# Patient Record
Sex: FEMALE | Race: WHITE | HISPANIC OR LATINO | Employment: PART TIME | URBAN - METROPOLITAN AREA
[De-identification: names, ages, dates, MRNs, and addresses within clinical notes are randomized per-mention and may not be internally consistent; named-entity substitution may affect disease eponyms.]

---

## 2017-02-21 ENCOUNTER — ALLSCRIPTS OFFICE VISIT (OUTPATIENT)
Dept: OTHER | Facility: OTHER | Age: 32
End: 2017-02-21

## 2017-02-27 ENCOUNTER — ALLSCRIPTS OFFICE VISIT (OUTPATIENT)
Dept: OTHER | Facility: OTHER | Age: 32
End: 2017-02-27

## 2017-02-28 ENCOUNTER — LAB REQUISITION (OUTPATIENT)
Dept: LAB | Facility: HOSPITAL | Age: 32
End: 2017-02-28
Payer: COMMERCIAL

## 2017-02-28 DIAGNOSIS — Z11.3 ENCOUNTER FOR SCREENING FOR INFECTIONS WITH PREDOMINANTLY SEXUAL MODE OF TRANSMISSION: ICD-10-CM

## 2017-02-28 PROCEDURE — 87591 N.GONORRHOEAE DNA AMP PROB: CPT | Performed by: OBSTETRICS & GYNECOLOGY

## 2017-02-28 PROCEDURE — 87491 CHLMYD TRACH DNA AMP PROBE: CPT | Performed by: OBSTETRICS & GYNECOLOGY

## 2017-03-01 LAB
CHLAMYDIA DNA CVX QL NAA+PROBE: NORMAL
N GONORRHOEA DNA GENITAL QL NAA+PROBE: NORMAL

## 2017-04-03 ENCOUNTER — HOSPITAL ENCOUNTER (EMERGENCY)
Facility: HOSPITAL | Age: 32
Discharge: HOME | End: 2017-04-03
Admitting: EMERGENCY MEDICINE
Payer: COMMERCIAL

## 2017-04-03 VITALS
BODY MASS INDEX: 32.31 KG/M2 | HEART RATE: 103 BPM | SYSTOLIC BLOOD PRESSURE: 132 MMHG | WEIGHT: 171 LBS | TEMPERATURE: 98.2 F | RESPIRATION RATE: 18 BRPM | DIASTOLIC BLOOD PRESSURE: 63 MMHG | OXYGEN SATURATION: 100 %

## 2017-04-03 DIAGNOSIS — J06.9 URI (UPPER RESPIRATORY INFECTION): Primary | ICD-10-CM

## 2017-04-03 PROCEDURE — 99282 EMERGENCY DEPT VISIT SF MDM: CPT

## 2017-04-03 RX ORDER — LEVOTHYROXINE SODIUM 0.05 MG/1
50 TABLET ORAL DAILY
COMMUNITY
Start: 2016-05-10 | End: 2018-07-16

## 2017-04-03 RX ORDER — AZITHROMYCIN 250 MG/1
TABLET, FILM COATED ORAL
Qty: 6 TABLET | Refills: 0 | Status: SHIPPED | OUTPATIENT
Start: 2017-04-03 | End: 2018-07-16

## 2017-04-03 RX ORDER — BENZONATATE 100 MG/1
100 CAPSULE ORAL 3 TIMES DAILY PRN
Qty: 30 CAPSULE | Refills: 0 | Status: SHIPPED | OUTPATIENT
Start: 2017-04-03 | End: 2018-07-16

## 2017-04-04 ENCOUNTER — ALLSCRIPTS OFFICE VISIT (OUTPATIENT)
Dept: OTHER | Facility: OTHER | Age: 32
End: 2017-04-04

## 2017-09-27 ENCOUNTER — ALLSCRIPTS OFFICE VISIT (OUTPATIENT)
Dept: OTHER | Facility: OTHER | Age: 32
End: 2017-09-27

## 2017-09-29 ENCOUNTER — ALLSCRIPTS OFFICE VISIT (OUTPATIENT)
Dept: OTHER | Facility: OTHER | Age: 32
End: 2017-09-29

## 2017-11-25 ENCOUNTER — HOSPITAL ENCOUNTER (EMERGENCY)
Facility: HOSPITAL | Age: 32
Discharge: HOME | End: 2017-11-26
Admitting: EMERGENCY MEDICINE
Payer: COMMERCIAL

## 2017-11-25 VITALS
SYSTOLIC BLOOD PRESSURE: 122 MMHG | OXYGEN SATURATION: 99 % | HEART RATE: 87 BPM | RESPIRATION RATE: 16 BRPM | BODY MASS INDEX: 30.23 KG/M2 | DIASTOLIC BLOOD PRESSURE: 81 MMHG | TEMPERATURE: 98.6 F | WEIGHT: 160 LBS

## 2017-11-25 DIAGNOSIS — R51.9 HEADACHE: ICD-10-CM

## 2017-11-25 DIAGNOSIS — M54.6 ACUTE RIGHT-SIDED THORACIC BACK PAIN: ICD-10-CM
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
## 2017-11-25 RX ORDER — IBUPROFEN 600 MG/1
600 TABLET ORAL ONCE
Status: COMPLETED | OUTPATIENT
Start: 2017-11-25 | End: 2017-11-26

## 2017-11-26 ENCOUNTER — APPOINTMENT (EMERGENCY)
Dept: RADIOLOGY | Facility: HOSPITAL | Age: 32
End: 2017-11-26
Payer: COMMERCIAL

## 2017-11-26 PROCEDURE — 99284 EMERGENCY DEPT VISIT MOD MDM: CPT

## 2017-11-26 PROCEDURE — 72070 X-RAY EXAM THORAC SPINE 2VWS: CPT

## 2017-11-26 RX ORDER — CYCLOBENZAPRINE HCL 10 MG
10 TABLET ORAL 3 TIMES DAILY PRN
Qty: 15 TABLET | Refills: 0 | Status: SHIPPED | OUTPATIENT
Start: 2017-11-26 | End: 2018-07-16

## 2017-11-26 RX ORDER — NAPROXEN 500 MG/1
500 TABLET ORAL 2 TIMES DAILY WITH MEALS
Qty: 14 TABLET | Refills: 0 | Status: SHIPPED | OUTPATIENT
Start: 2017-11-26 | End: 2018-07-16

## 2017-11-26 RX ADMIN — IBUPROFEN 600 MG: 600 TABLET, FILM COATED ORAL at 00:07

## 2017-11-26 NOTE — DISCHARGE INSTRUCTIONS
Back Pain   WHAT YOU NEED TO KNOW:   Back pain is common  It can be caused by many conditions, such as arthritis or the breakdown of spinal discs  Your risk for back pain is increased by injuries, lack of activity, or repeated bending and twisting  You may feel sore or stiff on one or both sides of your back  The pain may spread to your buttocks or thighs  DISCHARGE INSTRUCTIONS:   Medicines:   · NSAIDs  help decrease swelling and pain  This medicine is available with or without a doctor's order  NSAIDs can cause stomach bleeding or kidney problems in certain people  If you take blood thinner medicine, always ask your healthcare provider if NSAIDs are safe for you  Always read the medicine label and follow directions  · Acetaminophen  decreases pain  It is available without a doctor's order  Ask how much to take and how often to take it  Follow directions  Acetaminophen can cause liver damage if not taken correctly  · Prescription pain medicine  may be given  Ask your healthcare provider how to take this medicine safely  · Take your medicine as directed  Contact your healthcare provider if you think your medicine is not helping or if you have side effects  Tell him or her if you are allergic to any medicine  Keep a list of the medicines, vitamins, and herbs you take  Include the amounts, and when and why you take them  Bring the list or the pill bottles to follow-up visits  Carry your medicine list with you in case of an emergency  Follow up with your healthcare provider in 2 weeks, or as directed:  Write down your questions so you remember to ask them during your visits  How to manage your back pain:   · Apply ice  on your back or affected area for 15 to 20 minutes every hour or as directed  Use an ice pack, or put crushed ice in a plastic bag  Cover it with a towel  Ice helps prevent tissue damage and decreases pain      · Apply heat  on your back or affected area for 20 to 30 minutes every 2 hours for as many days as directed  Heat helps decrease pain and muscle spasms  · Stay active  as much as you can without causing more pain  Bed rest could make your back pain worse  Avoid heavy lifting until your pain is gone  Return to the emergency department if:   · You have pain, numbness, or weakness in one or both legs  · Your pain becomes so severe that you cannot walk  · You cannot control your urine or bowel movements  · You have severe back pain with chest pain  · You have severe back pain, nausea, and vomiting  · You have severe back pain that spreads to your side or genital area  Contact your healthcare provider if:   · You have back pain that does not get better with rest and pain medicine  · You have a fever  · You have pain that worsens when you are on your back or when you rest     · You have pain that worsens when you cough or sneeze  · You lose weight without trying  · You have questions or concerns about your condition or care  © 2017 [facility] Information is for End User's use only and may not be sold, redistributed or otherwise used for commercial purposes  All illustrations and images included in CareNotes® are the copyrighted property of [employer] , [email or web address]  or [employer] Nash  The above information is an  only  It is not intended as medical advice for individual conditions or treatments  Talk to your doctor, nurse or pharmacist before following any medical regimen to see if it is safe and effective for you
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
## 2017-12-12 ENCOUNTER — GENERIC CONVERSION - ENCOUNTER (OUTPATIENT)
Dept: FAMILY MEDICINE CLINIC | Facility: CLINIC | Age: 32
End: 2017-12-12

## 2017-12-12 ENCOUNTER — LAB CONVERSION - ENCOUNTER (OUTPATIENT)
Dept: FAMILY MEDICINE CLINIC | Facility: CLINIC | Age: 32
End: 2017-12-12

## 2018-01-09 NOTE — RESULT NOTES
Verified Results  (1) TSH [id number] 08:06AM Amina Carias   TW Order Number: [id number]  TW Order Number: [id number] Order Number: [id number] Order Number: [id number]     Test Name Result Flag Reference   TSH 4 090 uIU/mL H 0 358-3 740   The recommended reference ranges for TSH during pregnancy are as follows:  First trimester 0 1 to 2 5 uIU/mL  Second trimester  0 2 to 3 0 uIU/mL  Third trimester 0 3 to 3 0 uIU/m       Plan  Hypothyroidism    · Levothyroxine Sodium 50 MCG Oral Tablet; TAKE 1 TABLET DAILY on an empty  stomach at least 30 minutes prior to first meal of the day    Discussion/Summary      Thyroid stimulating hormone has increased and is now out of normal range  Due to her fatigue, weight gain and depression symptoms, we will start her on a low dose of thyroid medication that she needs to take every day on an empty stomach and not eat for 30 minutes  She should follow up in 2 months  She should have her ultrasound of her thyroid done before then       Signatures   Electronically signed by : Rashida De La Torre, [facility], [place]; May 10 2016  6:08PM EST                       (Author)

## 2018-01-12 VITALS
SYSTOLIC BLOOD PRESSURE: 120 MMHG | HEART RATE: 92 BPM | BODY MASS INDEX: 33.96 KG/M2 | WEIGHT: 173 LBS | OXYGEN SATURATION: 100 % | HEIGHT: 60 IN | TEMPERATURE: 97.9 F | RESPIRATION RATE: 18 BRPM | DIASTOLIC BLOOD PRESSURE: 80 MMHG

## 2018-01-12 NOTE — MISCELLANEOUS
Message  11/15/16 2025:LMOVM juan call back  STI testing negative except HepBsAB   has pt been vaccinated? BEO  11/15/16 ~2045: pt calls back  Above explained  Pt will query mother re vaccination   BEO      Signatures   Electronically signed by : AMBROCIO Interiano ; Nov 16 2016  7:29AM EST                       (Author)

## 2018-01-12 NOTE — RESULT NOTES
Verified Results  SURESWAB(R) CHLAMYDIA/ N  GONORRHOEAE RNA, TMA 04Apr2016 12:00AM Lore Guzman     Test Name Result Flag Reference   CHLAMYDIA TRACHOMATIS$RNA, TMA DETECTED A NOT DETECTED   A positive CT or NG Nucleic Acid Amplification Test  (NAAT) result should be interpreted in conjunction  with other laboratory and clinical data available to  the clinician  If clinically indicated, further   testing can be performed on the same sample using  an alternate molecular target  To order alternate  target test use 94857 (C  trachomatis) or   08579 (N  gonorrhoeae)  NEISSERIA GONORRHOEAE$RNA, TMA NOT DETECTED  NOT DETECTED   This test was performed using the [employer] COMBO2 Assay  ([place] [address] )  The analytical performance characteristics of this   assay, when used to test Sure[employer] specimens have  been determined by [employer]  Plan  Chlamydial cervicitis    · Azithromycin 500 MG Oral Tablet; TAKE 2 TABLET ONCE    Discussion/Summary   4/5/16 1740: pt aware of result, rec for tx asap(med to pharmacy), partner treatment, no sex x 3 wks after treatment  [address] calls, repeat testing 3 months   BEO

## 2018-01-12 NOTE — RESULT NOTES
Verified Results  (1) HEP B CORE AB, IgM [id number] 10:05AM Josie Halsted     Test Name Result Flag Reference   HEPATITIS B CORE$ANTIBODY (IGM) NON-REACTIVE  NON-REACTIVE     (Q) HEPATITIS C ANTIBODY [id number] 10:05AM Josie Halsted     Test Name Result Flag Reference   HEPATITIS C ANTIBODY NON-REACTIVE  NON-REACTIVE   SIGNAL TO CUT-OFF 0 01  <1 00     (1) HIV AG/AB COMBO, 4TH GEN [id number] 10:05AM Josie Halsted     Test Name Result Flag Reference   HIV AG/AB, 4TH GEN NON-REACTIVE  NON-REACTIVE   HIV-1 antigen and HIV-1/HIV-2 antibodies were not  detected  There is no laboratory evidence of HIV  infection  PLEASE NOTE: This information has been disclosed to  you from records whose confidentiality may be  protected by state law  If your state requires such  protection, then the state law prohibits you from  making any further disclosure of the information  without the specific written consent of the person  to whom it pertains, or as otherwise permitted by law  A general authorization for the release of medical or  other information is NOT sufficient for this purpose  For additional information please refer to  http://education  [employer]/faq/ZWD524  (This link is being provided for informational/  educational purposes only )        The performance of this assay has not been clinically  validated in patients less than 3years old       (Q) RPR (DX) W/REFL TITER AND CONFIRMATORY TESTING [id number] 10:05AM Josie Halsted   REPORT COMMENT:  FASTING:NO     Test Name Result Flag Reference   RPR (DX) W/REFL TITER AND$CONFIRMATORY TESTING NON-REACTIVE  NON-REACTIVE

## 2018-01-12 NOTE — RESULT NOTES
Verified Results  (Q) HEPATITIS B SURFACE ANTIBODY QL [id number] 10:05AM Tarik Ureña     Test Name Result Flag Reference   HEPATITIS B SURFACE$ANTIBODY QL REACTIVE A NON-REACTIVE

## 2018-01-13 NOTE — PROGRESS NOTES
Assessment    1  Well adult on routine health check (V70 0) (Z00 00)   2  Cervical cancer screening (V76 2) (Z12 4)   · 4/2015--pap wnl, Chlamydia +    Plan   Encounter for PPD test    · PPD    Follow-up visit in 1 year Evaluation and Treatment  Follow-up  Status: Hold For - Scheduling  Requested for: 60JGD2362  Ordered; For: Well adult on routine health check;  Ordered By: Kailee Galan  Performed:   Due: 22ZSY5741  3 - Maura GARCIA, Kaushik Kirk  (Internal Medicine) Co-Management  *  Status: Hold For - Scheduling  Requested for: 40UWW4179  Ordered; For: Cervical cancer screening;  Ordered By: Kailee Galan  Performed:   Due: 45RWM0503     Discussion/Summary  health maintenance visit Currently, she eats a healthy diet  Has appt with Dr Leydi Sierra Breast cancer screening: breast cancer screening is not indicated  Patient discussion: discussed with the patient  PPD placement today  Patient has no form from her employer to complete  The patient was counseled regarding instructions for management, impressions  The treatment plan was reviewed with the patient/guardian  The patient/guardian understands and agrees with the treatment plan     Self Referrals: No      Chief Complaint  pt here today for physical for work also needs PPD placement      History of Present Illness  HM, Adult Female: The patient is being seen for a precomforting health care  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  General Health: The patient's health since the last visit is described as good  She has regular dental visits  The patient brushes 2 time(s) a day and reports her last dental visit was 3 yrs ago  She denies vision problems  She denies hearing loss  Lifestyle:  She does not have a healthy diet  She has weight concerns  She does not exercise regularly  She does not use tobacco  She consumes alcohol  She reports occasional alcohol use  She denies drug use  Reproductive health:  Has appt with Maura in November     Screening: Review of Systems    Constitutional: No fever, no chills, feels well, no tiredness, no recent weight gain or weight loss  Eyes: No complaints of eye pain, no red eyes, no eyesight problems, no discharge, no dry eyes, no itching of eyes  ENT: no complaints of earache, no loss of hearing, no nose bleeds, no nasal discharge, no sore throat, no hoarseness  Cardiovascular: No complaints of slow heart rate, no fast heart rate, no chest pain, no palpitations, no leg claudication, no lower extremity edema  Respiratory: No complaints of shortness of breath, no wheezing, no cough, no SOB on exertion, no orthopnea, no PND  Gastrointestinal: No complaints of abdominal pain, no constipation, no nausea or vomiting, no diarrhea, no bloody stools  Genitourinary: No complaints of dysuria, no incontinence, no pelvic pain, no dysmenorrhea, no vaginal discharge or bleeding  Musculoskeletal: No complaints of arthralgias, no myalgias, no joint swelling or stiffness, no limb pain or swelling  Integumentary: No complaints of skin rash or lesions, no itching, no skin wounds, no breast pain or lump  Neurological: No complaints of headache, no confusion, no convulsions, no numbness, no dizziness or fainting, no tingling, no limb weakness, no difficulty walking  Psychiatric: Not suicidal, no sleep disturbance, no anxiety or depression, no change in personality, no emotional problems  Endocrine: No complaints of proptosis, no hot flashes, no muscle weakness, no deepening of the voice, no feelings of weakness  Hematologic/Lymphatic: No complaints of swollen glands, no swollen glands in the neck, does not bleed easily, does not bruise easily  Over the past 2 weeks, how often have you been bothered by the following problems? 1 ) Little interest or pleasure in doing things? Not at all    2 ) Feeling down, depressed or hopeless? Not at all    3 ) Trouble falling asleep or sleeping too much?  Not at all    4 ) Feeling tired or having little energy? Not at all    5 ) Poor appetite or overeating? Not at all    6 ) Feeling bad about yourself, or that you are a failure, or have let yourself or your family down? Not at all    7 ) Trouble concentrating on things, such as reading a newspaper or watching television? Not at all    8 ) Moving or speaking so slowly that other people could have noticed, or the opposite, moving or speaking faster than usual? Not at all  How difficult have these problems made it for you to do your work, take care of things at home, or get along with people? Not at all  Score 0      Active Problems    1  Acute upper respiratory infection (465 9) (J06 9)   2  Back pain, chronic (724 5,338 29) (M54 9,G89 29)   3  Bacterial vaginosis (616 10,041 9) (N76 0,B96 89)   4  Chlamydial cervicitis (079 88) (A56 09)   5  Contraceptive education (V25 09) (Z30 09)   6  Depression (311) (F32 9)   7  Dietary calcium deficiency (269 3) (E58)   8  Encounter for gynecological examination without abnormal finding (V72 31) (Z01 419)   9  Encounter for screening examination for chlamydial infection (V73 98) (Z11 8)   10  Exposure to STD (V01 6) (Z20 2)   11  Exposure to viral disease (V01 79) (Z20 828)   12  Fatigue (780 79) (R53 83)   13  Headache (784 0) (R51)   14  History of allergy (V15 09) (Z88 9)   15  Hypothyroidism (244 9) (E03 9)   16  Impacted cerumen of right ear (380 4) (H61 21)   17  Mild vitamin D deficiency (268 9) (E55 9)   18  Overweight (BMI 25 0-29 9) (278 02) (E66 3)   19  Postcoital bleeding (626 7) (N93 0)   20  Screening for STD (sexually transmitted disease) (V74 5) (Z11 3)   21   Spasm of back muscles (724 8) (M62 830)    Past Medical History    · History of Anxiety and depression (300 4) (F41 8)   · History of Chlamydia (079 98) (A74 9)   · History of Habitual aborter, nonpregnant (629 81) (N96)   · History of abnormal cervical Papanicolaou smear (V13 29) (S09 557)   · History of chickenpox (V12 09) (Z86 19)   · History of gonorrhea (V12 09) (Z86 19)   · History of herpes genitalis (V12 09) (Z86 19)   · History of pregnancy (V13 29)   · History of thyroid disease (V12 29) (Z86 39)    Surgical History    · History of Corneal LASIK   · History of Dilation And Curettage   · History of Oral Surgery Tooth Extraction   · History of Salpingectomy    Family History  Mother    · Family history of Anxiety   · Denied: Family history of Drug abuse   · Family history of Healthy adult  Father    · Denied: Family history of Drug abuse   · Family history of Healthy adult  Maternal Grandmother    · Family history of Alzheimer's disease (V17 2) (Z82 0)   · Family history of colon cancer (V16 0) (Z80 0)   · Family history of Hypertension (V17 49)  Paternal Grandmother    · Family history of Hypertension (V17 49)  Paternal Grandfather    · Family history of Diabetes Mellitus (V18 0)  Family History    · Family history of Diabetes Mellitus (V18 0)   · Family history of Hypertension (V17 49)    Social History    · Alcohol Use (History)   · ocassional/social   · Exercise habits   · 5x week   · High school graduate   · Housewife or homemaker   · Sabianism   ·    · Never A Smoker   · History of No drug use   · History of Occupation   · commerical cleaning   · Refusal of blood transfusions as patient is Sabianism   · Risk Factors For STD: Partner with multiple partners   · Social alcohol use (Z78 9)    Current Meds   1  Levothyroxine Sodium 50 MCG Oral Tablet; Take 1 tablet daily; Therapy: 09NQU8174 to (Evaluate:31Jan2017)  Requested for: 11Dmg7053; Last   Rx:59Asd7497 Ordered   2  Phentermine HCl - 37 5 MG Oral Capsule; Therapy: 44PVM1463 to Recorded   3  Topiramate 50 MG Oral Tablet; Therapy: 81FFM1776 to Recorded   4  Vitamin D (Ergocalciferol) 02398 UNIT Oral Capsule; take 1 capsule weekly;    Therapy: 74YVM3336 to (Evaluate:19Jan2017)  Requested for: 43Ylo5775; Last   Rx:63Tid0506 Ordered    Allergies 1  No Known Drug Allergies    Vitals   Recorded: 78JYK5685 08:46AM   Temperature 97 2 F, Tympanic   Heart Rate 95   Respiration 18   Systolic 462, RUE, Sitting   Diastolic 64, RUE, Sitting   Height 5 ft    Weight 157 lb 6 oz   BMI Calculated 30 74   BSA Calculated 1 69   O2 Saturation 99   LMP 81Ece6988     Physical Exam    Constitutional   General appearance: Abnormal   well developed, appears healthy, well nourished, overweight and well hydrated  Eyes   Conjunctiva and lids: No swelling, erythema or discharge  Pupils and irises: Equal, round, reactive to light  Ears, Nose, Mouth, and Throat   External inspection of ears and nose: Normal     Otoscopic examination: Tympanic membranes translucent with normal light reflex  Canals patent without erythema  Lips, teeth, and gums: Normal, good dentition  Oropharynx: Normal with no erythema, edema, exudate or lesions  Neck   Neck: Supple, symmetric, trachea midline, no masses  Thyroid: Normal, no thyromegaly  Pulmonary   Respiratory effort: No increased work of breathing or signs of respiratory distress  Auscultation of lungs: Clear to auscultation  Cardiovascular   Auscultation of heart: Normal rate and rhythm, normal S1 and S2, no murmurs  Abdomen   Abdomen: Non-tender, no masses  Liver and spleen: No hepatomegaly or splenomegaly  Lymphatic   Palpation of lymph nodes in neck: No lymphadenopathy      Musculoskeletal   Gait and station: Normal        Future Appointments    Date/Time Provider Specialty Site   09/29/2017 09:00 AM Fabricio HINKLE, Nurse Schedule  [place] [id number]     Signatures   Electronically signed by : ULICES Talamantes; Sep 27 2017  9:14AM EST                       (Author)    Electronically signed by : Adelita Goldberg, M D ; Sep 27 2017 12:50PM EST

## 2018-01-13 NOTE — RESULT NOTES
Verified Results  SURESWAB(R) CHLAMYDIA/ N  GONORRHOEAE RNA, TMA 25SOO8347 12:00AM Caralyjessica Alamin     Test Name Result Flag Reference   CHLAMYDIA TRACHOMATIS$RNA, TMA NOT DETECTED  NOT DETECTED   [facility]IA [facility] [address], TMA NOT DETECTED  NOT DETECTED   This test was performed using the [address] [place] [address]  ([address] )  The analytical performance characteristics of this   assay, when used to test SurePath specimens have  been determined by [facility] [employer] [facility] [employer] [facility].

## 2018-01-13 NOTE — RESULT NOTES
Message   Levothyroxine 50mcg daily started  Verified Results  US THYROID [id number] 11:34AM Lazaro Sams Order Number: [id number]     Test Name Result Flag Reference   US THYROID (Report)     THYROID ULTRASOUND     INDICATION: Fatigue, newly diagnosed hypothyroidism  COMPARISON: None     TECHNIQUE:  Ultrasound of the thyroid was performed with a high frequency linear transducer in transverse and sagittal planes including volumetric imaging sweeps as well as traditional still imaging technique  FINDINGS:   Normal homogeneous smooth echotexture  Right gland: 4 7 x 1 2 x 1 2 cm  No dominant nodules     Left gland: 3 6 x 1 0 x 1 3 cm  No dominant nodules  Isthmus: 0 3 cm in AP dimension  No dominant nodules  IMPRESSION:      Unremarkable thyroid ultrasound         Workstation performed: [id number]     Signed by:   Sarai Riley DO   5/16/16     (1) THYROID ANTIBODIES PANEL 21CYO6060 08:06AM Yajaira Melgar   TW Order Number: [id number]     Test Name Result Flag Reference   [facility] IU/mL  0 - 34   Performed at:  [address] [place] [address]  [id number]  : Tracey Lamas MD, Phone:  [phone]     (1) THYROID ANTIBODIES PANEL 54VLJ7076 08:06AM Yajaira Melgar   TW Order Number: [id number]     Test Name Result Flag Reference   THYROGLOB AB 9 9 IU/mL H 0 0 - 0 9   Thyroglobulin Antibody measured by [facility] Methodology    Performed at:  [address] [place] [address]  [id number]  : Tracey Lamas MD, Phone:  [phone]   [facility] 29 IU/mL  0 - 34   Performed at:  [address] [place] [address]  [id number]  : Tracey Lamas MD, Phone:  [phone]       Signatures   Electronically signed by : SHUBHAM Mariano; May 16 2016 12:20PM EST                       (Author)

## 2018-01-14 VITALS
WEIGHT: 173.31 LBS | TEMPERATURE: 98.9 F | RESPIRATION RATE: 18 BRPM | OXYGEN SATURATION: 100 % | BODY MASS INDEX: 32.72 KG/M2 | HEART RATE: 112 BPM | SYSTOLIC BLOOD PRESSURE: 118 MMHG | DIASTOLIC BLOOD PRESSURE: 88 MMHG | HEIGHT: 61 IN

## 2018-01-14 VITALS
HEIGHT: 60 IN | HEART RATE: 95 BPM | BODY MASS INDEX: 30.9 KG/M2 | DIASTOLIC BLOOD PRESSURE: 64 MMHG | TEMPERATURE: 97.2 F | OXYGEN SATURATION: 99 % | WEIGHT: 157.38 LBS | SYSTOLIC BLOOD PRESSURE: 110 MMHG | RESPIRATION RATE: 18 BRPM

## 2018-01-15 VITALS
DIASTOLIC BLOOD PRESSURE: 80 MMHG | HEIGHT: 60 IN | WEIGHT: 175 LBS | BODY MASS INDEX: 34.36 KG/M2 | SYSTOLIC BLOOD PRESSURE: 117 MMHG

## 2018-01-15 NOTE — RESULT NOTES
Message   Patient contacted regarding low vitamin D  Weekly supplement sent to pharmacy for patient  Verified Results  (1) COMPREHENSIVE METABOLIC PANEL 97JSK7003 02:03GB Kasey Jacobs 65 Kidney Disease Education Program recommendations are as follows:  GFR calculation is accurate only with a steady state creatinine  Chronic Kidney disease less than 60 ml/min/1 73 sq  meters  Kidney failure less than 15 ml/min/1 73 sq  meters  Test Name Result Flag Reference   GLUCOSE,RANDM 88 mg/dL     If the patient is fasting, the ADA then defines impaired fasting glucose as > 100 mg/dL and diabetes as > or equal to 123 mg/dL  SODIUM 137 mmol/L  136-145   POTASSIUM 4 5 mmol/L  3 5-5 3   CHLORIDE 104 mmol/L  100-108   CARBON DIOXIDE 26 mmol/L  21-32   ANION GAP (CALC) 7 mmol/L  4-13   BLOOD UREA NITROGEN 19 mg/dL  5-25   CREATININE 0 54 mg/dL L 0 60-1 30   CALCIUM 8 9 mg/dL  8 3-10 1   BILI, TOTAL 0 37 mg/dL  0 20-1 00   ALK PHOSPHATAS 99 U/L     ALT (SGPT) 16 U/L  12-78   AST(SGOT) 10 U/L  5-45   ALBUMIN 3 9 g/dL  3 5-5 0   TOTAL PROTEIN 7 3 g/dL  6 4-8 2   eGFR Non-African American      >60 0 ml/min/1 73sq m     (1) CBC/PLT/DIFF 90RSA2139 11:04AM Jose Martin Stair     Test Name Result Flag Reference   WBC COUNT 5 38 Thousand/uL  4 31-10 16   RBC COUNT 4 40 Million/uL  3 81-5 12   HEMOGLOBIN 12 1 g/dL  11 5-15 4   HEMATOCRIT 37 9 %  34 8-46  1   MCV 86 fL  82-98   MCH 27 5 pg  26 8-34 3   MCHC 31 9 g/dL  31 4-37 4   RDW 14 2 %  11 6-15 1   MPV 9 4 fL  8 9-12 7   PLATELET COUNT 767 Thousands/uL  149-390   nRBC AUTOMATED 0 /100 WBCs     NEUTROPHILS RELATIVE PERCENT 61 %  43-75   LYMPHOCYTES RELATIVE PERCENT 29 %  14-44   MONOCYTES RELATIVE PERCENT 7 %  4-12   EOSINOPHILS RELATIVE PERCENT 2 %  0-6   BASOPHILS RELATIVE PERCENT 1 %  0-1   NEUTROPHILS ABSOLUTE COUNT 3 30 Thousands/µL  1 85-7 62   LYMPHOCYTES ABSOLUTE COUNT 1 56 Thousands/µL  0 60-4 47   MONOCYTES ABSOLUTE COUNT 0 38 Thousand/µL  0 17-1 22 EOSINOPHILS ABSOLUTE COUNT 0 09 Thousand/µL  0 00-0 61   BASOPHILS ABSOLUTE COUNT 0 03 Thousands/µL  0 00-0 10     (1) LIPID PANEL FASTING W DIRECT LDL REFLEX 42YJI3157 11:04AM Jeanette Nurse   Triglyceride:         Normal              <150 mg/dl       Borderline High    150-199 mg/dl       High               200-499 mg/dl       Very High          >499 mg/dl  Cholesterol:         Desirable        <200 mg/dl      Borderline High  200-239 mg/dl      High             >239 mg/dl  HDL Cholesterol:        High    >59 mg/dL      Low     <41 mg/dL  LDL Cholesterol:        Optimal          <100 mg/dl         Near Optimal     100-129 mg/dl        Above Optimal          Borderline High   130-159 mg/dl          High              160-189 mg/dl          Very High        >189 mg/dl  LDL CALCULATED:    This screening LDL is a calculated result  It does not have the accuracy of the Direct Measured LDL in the monitoring of patients with hyperlipidemia and/or statin therapy  Direct Measure LDL (VFP283) must be ordered separately in these patients  Test Name Result Flag Reference   CHOLESTEROL 180 mg/dL     LDL CHOLESTEROL CALCULATED 99 mg/dL  0-100   TRIGLYCERIDES 66 mg/dL  <=150   HDL,DIRECT 68 mg/dL H 40-60     (1) TSH WITH FT4 REFLEX [id number] 11:04AM Jeanette Nurse   Patients undergoing fluorescein dye angiography may retain small amounts of fluorescein in the body for 48-72 hours post procedure  Samples containing fluorescein can produce falsely depressed TSH values  If the patient had this procedure,a specimen should be resubmitted post fluorescein clearance          The recommended reference ranges for TSH during pregnancy are as follows:  First trimester 0 1 to 2 5 uIU/mL  Second trimester  0 2 to 3 0 uIU/mL  Third trimester 0 3 to 3 0 uIU/m     Test Name Result Flag Reference   TSH 1 690 uIU/mL  0 358-3 740     (1) VITAMIN B12 43KKE9917 11:04AM Jeanette Nurse     Test Name Result Flag Reference   VITAMIN B12 609 pg/mL  100-900     (1) URINALYSIS (will reflex a microscopy if leukocytes, occult blood, protein or nitrites are not within normal limits) 12KBE5087 11:04AM Izabela Holland     Test Name Result Flag Reference   COLOR Yellow     CLARITY Clear     SPECIFIC GRAVITY UA 1 025  1 003-1 030   PH UA 6 0  4 5-8 0   LEUKOCYTE ESTERASE UA Negative  Negative   NITRITE UA Negative  Negative   PROTEIN UA Negative mg/dl  Negative   GLUCOSE UA Negative mg/dl  Negative   KETONES UA Negative mg/dl  Negative   UROBILINOGEN UA 0 2 E U /dl  0 2, 1 0 E U /dl   BILIRUBIN UA Negative  Negative   BLOOD UA Negative  Negative     (1) VITAMIN D 25-HYDROXY 18GCG1610 11:04AM Izabela Holland     Test Name Result Flag Reference   VIT D 25-HYDROX 18 6 ng/mL L 30 0-100 0       Plan  Mild vitamin D deficiency    · Vitamin D (Ergocalciferol) 39456 UNIT Oral Capsule; TAKE 1 CAPSULE WEEKLY    Signatures   Electronically signed by : SHUBHAM Owusu; Feb 6 2016  1:30PM EST                       (Author)

## 2018-01-16 NOTE — PROGRESS NOTES
Chief Complaint  Pt here today for PPD read, Results are negative @0MM SR16      Active Problems    1  Acute upper respiratory infection (465 9) (J06 9)   2  Back pain, chronic (724 5,338 29) (M54 9,G89 29)   3  Bacterial vaginosis (616 10,041 9) (N76 0,B96 89)   4  Cervical cancer screening (V76 2) (Z12 4)   5  Chlamydial cervicitis (079 88) (A56 09)   6  Contraceptive education (V25 09) (Z30 09)   7  Depression (311) (F32 9)   8  Depression screening (V79 0) (Z13 89)   9  Dietary calcium deficiency (269 3) (E58)   10  Encounter for gynecological examination without abnormal finding (V72 31) (Z01 419)   11  Encounter for PPD test (V74 1) (Z11 1)   12  Encounter for screening examination for chlamydial infection (V73 98) (Z11 8)   13  Exposure to STD (V01 6) (Z20 2)   14  Exposure to viral disease (V01 79) (Z20 828)   15  Fatigue (780 79) (R53 83)   16  Headache (784 0) (R51)   17  History of allergy (V15 09) (Z88 9)   18  Hypothyroidism (244 9) (E03 9)   19  Impacted cerumen of right ear (380 4) (H61 21)   20  Mild vitamin D deficiency (268 9) (E55 9)   21  Overweight (BMI 25 0-29 9) (278 02) (E66 3)   22  Postcoital bleeding (626 7) (N93 0)   23  Screening for STD (sexually transmitted disease) (V74 5) (Z11 3)   24  Spasm of back muscles (724 8) (M62 830)   25  Well adult on routine health check (V70 0) (Z00 00)    Current Meds   1  Levothyroxine Sodium 50 MCG Oral Tablet; Take 1 tablet daily; Therapy: 16GHS8311 to (Evaluate:31Jan2017)  Requested for: 13Zle2357; Last   Rx:98Fmm8955 Ordered   2  Phentermine HCl - 37 5 MG Oral Capsule; Therapy: 04CRF0549 to Recorded   3  Topiramate 50 MG Oral Tablet; Therapy: 76QMB0282 to Recorded   4  Vitamin D (Ergocalciferol) 03591 UNIT Oral Capsule; take 1 capsule weekly; Therapy: 19ILB7027 to (Evaluate:19Jan2017)  Requested for: 22Ief3167; Last   Rx:20Irr2454 Ordered    Allergies    1   No Known Drug Allergies    Plan  Encounter for PPD test    · PPD    Signatures Electronically signed by : Marlon Hsu, [facility]; Oct  2 2017  7:45AM EST                       (Author)    Electronically signed by : AMBROCIO Hill ; Oct  2 2017  3:51PM EST

## 2018-01-17 NOTE — MISCELLANEOUS
Message  11/18/16 1330: LMOVM to  medicine at pharmacy, to expect call from [facility], and need for three month repeat eval  Call with ?'s   BEO      Plan  Chlamydial cervicitis    · Azithromycin 500 MG Oral Tablet; TAKE 2 TABLET ONCE   · Follow-up visit in 3 months Evaluation and Treatment  Follow-up  Status: Hold For -  Scheduling  Requested for: 02DML0461    Signatures   Electronically signed by : AMBROCIO Hernandez ; Nov 18 2016  1:35PM EST                       (Author)

## 2018-01-17 NOTE — RESULT NOTES
Verified Results  SURESWAB(R) CHLAMYDIA/ N  GONORRHOEAE RNA, TMA [id number] 12:00AM Azra Siemens     Test Name Result Flag Reference   CHLAMYDIA TRACHOMATIS$RNA, TMA DETECTED A NOT DETECTED   A positive CT or NG Nucleic Acid Amplification Test  (NAAT) result should be interpreted in conjunction  with other laboratory and clinical data available to  the clinician  If clinically indicated, further   testing can be performed on the same sample using  an alternate molecular target  To order alternate  target test use 49264 (C  trachomatis) or   18354 (N  gonorrhoeae)  NEISSERIA GONORRHOEAE$RNA, TMA NOT DETECTED  NOT DETECTED   This test was performed using the APTIMA COMBO2 Assay  ([address] )  The analytical performance characteristics of this   assay, when used to test SurePath specimens have  been determined by [facility]

## 2018-07-16 ENCOUNTER — APPOINTMENT (EMERGENCY)
Dept: RADIOLOGY | Facility: HOSPITAL | Age: 33
End: 2018-07-16
Payer: COMMERCIAL

## 2018-07-16 ENCOUNTER — HOSPITAL ENCOUNTER (EMERGENCY)
Facility: HOSPITAL | Age: 33
Discharge: HOME | End: 2018-07-16
Attending: EMERGENCY MEDICINE | Admitting: EMERGENCY MEDICINE
Payer: COMMERCIAL

## 2018-07-16 VITALS
OXYGEN SATURATION: 100 % | SYSTOLIC BLOOD PRESSURE: 126 MMHG | DIASTOLIC BLOOD PRESSURE: 68 MMHG | TEMPERATURE: 98.3 F | BODY MASS INDEX: 31.83 KG/M2 | RESPIRATION RATE: 16 BRPM | WEIGHT: 163 LBS | HEART RATE: 74 BPM

## 2018-07-16 DIAGNOSIS — M79.672 LEFT FOOT PAIN: Primary | ICD-10-CM

## 2018-07-16 PROCEDURE — 99283 EMERGENCY DEPT VISIT LOW MDM: CPT

## 2018-07-16 PROCEDURE — 73630 X-RAY EXAM OF FOOT: CPT

## 2018-07-16 RX ORDER — IBUPROFEN 600 MG/1
600 TABLET ORAL ONCE
Status: COMPLETED | OUTPATIENT
Start: 2018-07-16 | End: 2018-07-16

## 2018-07-16 RX ORDER — ACETAMINOPHEN 325 MG/1
650 TABLET ORAL ONCE
Status: COMPLETED | OUTPATIENT
Start: 2018-07-16 | End: 2018-07-16

## 2018-07-16 RX ADMIN — IBUPROFEN 600 MG: 600 TABLET ORAL at 00:27

## 2018-07-16 RX ADMIN — ACETAMINOPHEN 650 MG: 325 TABLET, FILM COATED ORAL at 00:27

## 2018-07-16 NOTE — DISCHARGE INSTRUCTIONS
Metatarsalgia   AMBULATORY CARE:   Metatarsalgia  is pain in the ball of your foot, near your second, third, and fourth toes  Common signs and symptoms of metatarsalgia:  Symptoms usually develop over time, but you may have sudden pain from an injury  You may have any of the following:  · Pain at the ball of your foot or near your toes that gets worse when you walk or stand, especially on hard surfaces    · Pain during exercises such as running    · Sharp or shooting pain in your toes that may get worse when you flex your toes    · Tingling or numbness in your toes    · Feeling like you are walking over rocks, or that you have a bruise    · A change in the way you walk because you try to avoid putting pressure on the ball of your foot  Contact your healthcare provider if:   · You develop knee, back, or hip pain  · You have more pain or redness in the foot  · You have questions or concerns about your condition or care  Treatment:  The cause of your metatarsalgia will be treated, if possible  You may also need any of the following:  · NSAIDs , such as ibuprofen, help decrease swelling, pain, and fever  This medicine is available with or without a doctor's order  NSAIDs can cause stomach bleeding or kidney problems in certain people  If you take blood thinner medicine, always ask if NSAIDs are safe for you  Always read the medicine label and follow directions  Do not give these medicines to children under 10months of age without direction from your child's healthcare provider  · Ultrasound  may be used to relieve your pain  Sound waves from the ultrasound can help send heat deeper into your tissues  · A steroid injection  may help decrease inflammation  · Surgery  may be needed if other treatments do not work  Surgery is used to align the bones near your toes  You may also need surgery to fix a problem such as hammertoe  Manage or prevent metatarsalgia:   · Rest your foot    If you play sports, you may not be able to do weight-bearing exercises  Examples include swimming and bike riding  Ask your healthcare provider which exercises are safe for you  · Apply ice as directed  Ice helps reduce pain and swelling  Use an ice pack, or put crushed ice in a plastic bag  Cover the pack or bag with a towel before you apply it to your foot  Apply ice for 15 to 20 minutes every hour, or as directed  · Use a cane or crutch if directed  These devices may help take pressure off your foot while it heals  · Wear proper shoes  Do not wear shoes that are narrow or tight  You may need to wear shoes that are wider than you usually wear  Choose shoes that do not have a raised heel  Shock-absorbing shoes can help prevent injury  These shoes will have extra support under your feet and toes  You can also add shoe cushions inside your shoes or to the bottoms of your feet, near your toes  The cushions may provide more support and make walking or standing more comfortable  Arch supports may help take pressure off your toes  · Reach or maintain a healthy weight  Extra weight can put pressure on your feet  Talk to your healthcare provider about a healthy weight for you  Your provider can help you create a safe weight loss plan if you are overweight  · Go to physical therapy if directed  A physical therapist can help improve your strength and range of motion  The therapist can also help you improve the way you walk to prevent metatarsalgia from happening again  Your therapist can also teach you exercises to help relieve your pain  Follow up with your healthcare provider as directed:  Write down your questions so you remember to ask them during your visits  © 2017 [facility] Information is for End User's use only and may not be sold, redistributed or otherwise used for commercial purposes   All illustrations and images included in CareNotes® are the copyrighted property of [employer]  or [facility] [employer]  The above information is an  only  It is not intended as medical advice for individual conditions or treatments  Talk to your doctor, nurse or pharmacist before following any medical regimen to see if it is safe and effective for you

## 2018-07-16 NOTE — ED NOTES
Ace wrap applied to L foot at this time  Patient tolerated well with no complaints        Scott Cash, DO  07/16/18 6383

## 2018-07-16 NOTE — ED PROVIDER NOTES
History  Chief Complaint   Patient presents with    Foot Pain     Pt c/o pain on top of left foot and reports there is a bump in the area it hurts; ongoing since 7/13/18; denies any injury     34 yo F presenting with L foot pain x 3 days  Pt reports first feeling a burning on the dorsum of her L foot but did not have any difficulty weight bearing  She reports the pain and burning became worse the next day because she was working and on her feet all day which exacerbated her pain  Today, she reports the pain 'is at its worst'  She reports taking ibuprofen at home which has relieved the same only mildly  She states the pain occasionally radiates towards the ankle and now there is a 'bump' on the dorsum of L foot  She denies any numbness or tingling  She denies any trauma, injury or falls  None       Past Medical History:   Diagnosis Date    Anxiety     ASCUS with positive high risk HPV cervical 03/2008    Chicken pox     Depression     Disease of thyroid gland     hypothyroid    Habitual aborter not currently pregnant     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       Past Surgical History:   Procedure Laterality Date    COLPOSCOPY  01/2013    LASIK      Last Assessed - 09 May 2016    SALPINGECTOMY Bilateral 05/2015    Scope bs    TOOTH EXTRACTION      TUBAL LIGATION         Family History   Problem Relation Age of Onset    Anxiety disorder Mother     Dementia Maternal Grandmother         Alzheimer's    Colon cancer Maternal Grandmother     Hypertension Maternal Grandmother     Hypertension Paternal Grandmother     Diabetes Paternal Grandfather      I have reviewed and agree with the history as documented  Social History   Substance Use Topics    Smoking status: Never Smoker    Smokeless tobacco: Never Used    Alcohol use Yes      Comment: occ        Review of Systems   All other systems reviewed and are negative        Physical Exam  Physical Exam   Constitutional: She is oriented to person, place, and time  She appears well-developed and well-nourished  No distress  HENT:   Head: Normocephalic and atraumatic  Eyes: Conjunctivae are normal    EOM grossly intact   Neck: Normal range of motion  Neck supple  No JVD present  Cardiovascular: Normal rate  Pulmonary/Chest: Effort normal    Abdominal: Soft  Musculoskeletal:   FROM LLE, DP and PT pulses intact LLE, 1cm firm mass palpated dorsum of midfoot, nonerythematous and nonecchymotic, no lacerations or abrasions, steady gait, cap refill brisk, strength and sensation intact throughout LE b/l   Neurological: She is alert and oriented to person, place, and time  Skin: Skin is warm and dry  Capillary refill takes less than 2 seconds  Psychiatric: She has a normal mood and affect  Her behavior is normal    Nursing note and vitals reviewed        Vital Signs  ED Triage Vitals [07/16/18 0004]   Temperature Pulse Respirations Blood Pressure SpO2   98 3 °F (36 8 °C) 74 16 126/68 100 %      Temp Source Heart Rate Source Patient Position - Orthostatic VS BP Location FiO2 (%)   Oral Monitor -- -- --      Pain Score       5           Vitals:    07/16/18 0004   BP: 126/68   Pulse: 74       Visual Acuity      ED Medications  Medications   ibuprofen (MOTRIN) tablet 600 mg (600 mg Oral Given 7/16/18 0027)   acetaminophen (TYLENOL) tablet 650 mg (650 mg Oral Given 7/16/18 0027)       Diagnostic Studies  Results Reviewed     None                 XR foot 3+ views LEFT   ED Interpretation by Curly Fried PA-C (07/16 0036)   No acute fx                 Procedures  Procedures       Phone Contacts  ED Phone Contact    ED Course                               MDM  Number of Diagnoses or Management Options  Left foot pain:   Diagnosis management comments: No acute abnormalities or fx visualized on xray, possible lipoma, will refer to podiatry, RICE, nsaids  All labs and imaging discussed with patient, strict return to ED precautions discussed  Pt verbalizes understanding and agrees with plan  Pt is stable for discharge      CritCare Time    Disposition  Final diagnoses:   Left foot pain     Time reflects when diagnosis was documented in both MDM as applicable and the Disposition within this note     Time User Action Codes Description Comment    7/16/2018 12:50 AM Kaley Stephenson Add [Z12 789] Left foot pain       ED Disposition     ED Disposition Condition Comment    Discharge  [facility] discharge to home/self care  Condition at discharge: Good        Follow-up Information     Follow up With Specialties Details Why [address]anda Bruno MD Family Medicine   [facility] Mcclendon [address] [facility]       Kemal Rosa DPM Podiatry   [address] [place] [address] [address] [facility]  [phone]            Patient's Medications   Discharge Prescriptions    No medications on file     No discharge procedures on file      ED Provider  Electronically Signed by           Danita Camara PA-C  07/16/18 8907

## 2018-07-19 RX ORDER — TOPIRAMATE 50 MG/1
TABLET, FILM COATED ORAL
COMMUNITY
Start: 2017-09-25 | End: 2018-07-24

## 2018-07-19 RX ORDER — LEVOTHYROXINE SODIUM 0.05 MG/1
1 TABLET ORAL DAILY
COMMUNITY
Start: 2016-05-10 | End: 2018-07-24

## 2018-07-19 RX ORDER — PHENTERMINE HYDROCHLORIDE 37.5 MG/1
CAPSULE ORAL
COMMUNITY
Start: 2017-05-28 | End: 2018-07-24

## 2018-07-19 RX ORDER — ERGOCALCIFEROL 1.25 MG/1
1 CAPSULE ORAL WEEKLY
COMMUNITY
Start: 2016-02-06 | End: 2019-03-05 | Stop reason: ALTCHOICE

## 2018-07-24 ENCOUNTER — OFFICE VISIT (OUTPATIENT)
Dept: FAMILY MEDICINE CLINIC | Facility: CLINIC | Age: 33
End: 2018-07-24
Payer: COMMERCIAL

## 2018-07-24 ENCOUNTER — TELEPHONE (OUTPATIENT)
Dept: FAMILY MEDICINE CLINIC | Facility: CLINIC | Age: 33
End: 2018-07-24

## 2018-07-24 VITALS
RESPIRATION RATE: 18 BRPM | WEIGHT: 167 LBS | HEIGHT: 60 IN | TEMPERATURE: 98.2 F | OXYGEN SATURATION: 98 % | SYSTOLIC BLOOD PRESSURE: 102 MMHG | BODY MASS INDEX: 32.79 KG/M2 | HEART RATE: 80 BPM | DIASTOLIC BLOOD PRESSURE: 70 MMHG

## 2018-07-24 DIAGNOSIS — R53.83 FATIGUE, UNSPECIFIED TYPE: Primary | ICD-10-CM

## 2018-07-24 DIAGNOSIS — F31.10 BIPOLAR DISORDER, CURRENT EPISODE MANIC WITHOUT PSYCHOTIC FEATURES (HCC): ICD-10-CM

## 2018-07-24 DIAGNOSIS — E55.9 MILD VITAMIN D DEFICIENCY: ICD-10-CM

## 2018-07-24 DIAGNOSIS — M79.672 LEFT FOOT PAIN: Primary | ICD-10-CM

## 2018-07-24 PROCEDURE — 99051 MED SERV EVE/WKEND/HOLIDAY: CPT | Performed by: PHYSICIAN ASSISTANT

## 2018-07-24 PROCEDURE — 99214 OFFICE O/P EST MOD 30 MIN: CPT | Performed by: PHYSICIAN ASSISTANT

## 2018-07-24 PROCEDURE — 3008F BODY MASS INDEX DOCD: CPT | Performed by: PHYSICIAN ASSISTANT

## 2018-07-24 NOTE — PATIENT INSTRUCTIONS
Patient has been advised to get her records from [place] [address]  Some other lab results are currently pending  Follow up with Psychiatry as scheduled in August   Continue the vitamin-D supplement as per the recommendation of endocrinology  Further treatment recommendations pending review of records from [place] [address]

## 2018-07-24 NOTE — TELEPHONE ENCOUNTER
Pt has a podiatry apt and needs an order faxed to [phone]  Pt was in ER and Dx is [id number] pain in left foot

## 2018-07-24 NOTE — PROGRESS NOTES
Assessment/Plan:    Patient Instructions   Patient has been advised to get her records from [place] [address]  Some other lab results are currently pending  Follow up with Psychiatry as scheduled in August   Further treatment recommendations pending review of records from [place] [address]  M*Modal software was used to dictate this note  It may contain errors with dictating incorrect words/spelling  Please contact provider directly for any questions  Diagnoses and all orders for this visit:    Fatigue, unspecified type    Bipolar disorder, current episode manic without psychotic features ([facility]    Mild vitamin D deficiency    Other orders  -     Discontinue: levothyroxine 50 mcg tablet; Take 1 tablet by mouth daily  -     Discontinue: phentermine 37 5 MG capsule; Take by mouth  -     Discontinue: topiramate (TOPAMAX) 50 MG tablet; Take by mouth  -     ergocalciferol (VITAMIN D2) 50,000 units; Take 1 capsule by mouth once a week  -     Cancel: Ambulatory referral to Obstetrics / Gynecology; Future          Subjective:      Patient ID: Lilli Lucero is a 29 y o  female  Patient presents today for evaluation of fatigue  After making this appointment about 1 month ago she was able to get in with her endocrinologist at [address] who has done blood work with no findings of continued hypothyroidism but states that she was told she has anemia  Further blood work was completed then along with labs for possible different arthritis is because of intermittent joint pain  She is still awaiting the results  She does not have any copies today for review  The only medication she is taking at this time as the vitamin-D supplement  She states that she has been working a lot of overtime at [employer] over the past year  She also has a history of bipolar disorder and was not following with the psychiatrist on a regular basis    She used to be on [employer] and Prozac but has been off these medications for a while  She states about a month ago she was feeling very stressed but more anxious  She called the psychiatrist and appointment was made for August   Denies any suicidal ideations  She was told by the psychiatry office that she seems to be more anxious at this point and Latuda and Prozac may no longer be the recommended medications  The following portions of the patient's history were reviewed and updated as appropriate:   She  has a past medical history of Anxiety; ASCUS with positive high risk HPV cervical (03/2008); Chicken pox; Depression; Disease of thyroid gland; Habitual aborter not currently pregnant; and LGSIL of cervix of undetermined significance (12/2012)  She   Patient Active Problem List    Diagnosis Date Noted    Left foot pain 07/24/2018    [facility], [facility] episode [facility] psychotic features [facility] 11/20/2016    Mild vitamin D deficiency 02/06/2016    Depression 02/03/2016    Fatigue 02/03/2016    Back pain, chronic 01/06/2015     She  has a past surgical history that includes Tubal ligation; Colposcopy (01/2013); LASIK; Salpingectomy (Bilateral, 05/2015); and Tooth extraction  Her family history includes Anxiety disorder in her mother; Colon cancer in her maternal grandmother; Dementia in her maternal grandmother; Diabetes in her paternal grandfather; Hypertension in her maternal grandmother and paternal grandmother  She  reports that she has never smoked  She has never used smokeless tobacco  She reports that she drinks alcohol  She reports that she does not use drugs  Current Outpatient Prescriptions   Medication Sig Dispense Refill    ergocalciferol (VITAMIN D2) 50,000 units Take 1 capsule by mouth once a week       No current facility-administered medications for this visit  No current outpatient prescriptions on file prior to visit  No current facility-administered medications on file prior to visit  She has No Known Allergies       Review of Systems   Constitutional: Positive for fatigue  Gastrointestinal:        Has occasional abdominal bloating but no pain at this time  Musculoskeletal:        As stated in HPI   Psychiatric/Behavioral:        As stated in HPI         Objective:      /70   Pulse 80   Temp 98 2 °F (36 8 °C) (Tympanic)   Resp 18   Ht 5' (1 524 m)   Wt 75 8 kg (167 lb)   LMP 06/27/2018   SpO2 98%   BMI 32 61 kg/m²          Physical Exam   Constitutional: She appears well-developed and well-nourished  No distress  HENT:   Head: Normocephalic and atraumatic  Right Ear: External ear normal    Left Ear: External ear normal    Mouth/Throat: Oropharynx is clear and moist    Neck: Neck supple  No thyromegaly present  Cardiovascular: Normal rate, regular rhythm and normal heart sounds  Exam reveals no gallop and no friction rub  No murmur heard  Pulmonary/Chest: Effort normal and breath sounds normal  No respiratory distress  She has no wheezes  She has no rales  Abdominal: Soft  Bowel sounds are normal  She exhibits no mass  There is no tenderness  Lymphadenopathy:     She has no cervical adenopathy  Neurological: She is alert  Skin: Skin is warm  Psychiatric: She has a normal mood and affect

## 2018-09-06 ENCOUNTER — APPOINTMENT (EMERGENCY)
Dept: RADIOLOGY | Facility: HOSPITAL | Age: 33
End: 2018-09-06
Payer: COMMERCIAL

## 2018-09-06 ENCOUNTER — HOSPITAL ENCOUNTER (EMERGENCY)
Facility: HOSPITAL | Age: 33
Discharge: HOME | End: 2018-09-06
Attending: EMERGENCY MEDICINE
Payer: COMMERCIAL

## 2018-09-06 VITALS
RESPIRATION RATE: 18 BRPM | SYSTOLIC BLOOD PRESSURE: 121 MMHG | WEIGHT: 167.11 LBS | OXYGEN SATURATION: 100 % | HEART RATE: 74 BPM | TEMPERATURE: 98.3 F | DIASTOLIC BLOOD PRESSURE: 74 MMHG | BODY MASS INDEX: 32.64 KG/M2

## 2018-09-06 DIAGNOSIS — R53.83 FATIGUE: ICD-10-CM

## 2018-09-06 DIAGNOSIS — J04.0 LARYNGITIS: ICD-10-CM

## 2018-09-06 DIAGNOSIS — R07.9 CHEST PAIN: Primary | ICD-10-CM

## 2018-09-06 LAB
ALBUMIN SERPL BCP-MCNC: 3.6 G/DL (ref 3.5–5)
ALP SERPL-CCNC: 110 U/L (ref 46–116)
ALT SERPL W P-5'-P-CCNC: 48 U/L (ref 12–78)
ANION GAP SERPL CALCULATED.3IONS-SCNC: 7 MMOL/L (ref 4–13)
AST SERPL W P-5'-P-CCNC: 24 U/L (ref 5–45)
BACTERIA UR QL AUTO: ABNORMAL /HPF
BASOPHILS # BLD AUTO: 0.02 THOUSANDS/ΜL (ref 0–0.1)
BASOPHILS NFR BLD AUTO: 0 % (ref 0–1)
BILIRUB SERPL-MCNC: 0.26 MG/DL (ref 0.2–1)
BILIRUB UR QL STRIP: NEGATIVE
BUN SERPL-MCNC: 19 MG/DL (ref 5–25)
CALCIUM SERPL-MCNC: 9.4 MG/DL (ref 8.3–10.1)
CHLORIDE SERPL-SCNC: 103 MMOL/L (ref 100–108)
CLARITY UR: CLEAR
CO2 SERPL-SCNC: 28 MMOL/L (ref 21–32)
COLOR UR: YELLOW
CREAT SERPL-MCNC: 0.69 MG/DL (ref 0.6–1.3)
EOSINOPHIL # BLD AUTO: 0.14 THOUSAND/ΜL (ref 0–0.61)
EOSINOPHIL NFR BLD AUTO: 2 % (ref 0–6)
ERYTHROCYTE [DISTWIDTH] IN BLOOD BY AUTOMATED COUNT: 15 % (ref 11.6–15.1)
EXT PREG TEST URINE: NORMAL
GFR SERPL CREATININE-BSD FRML MDRD: 116 ML/MIN/1.73SQ M
GLUCOSE SERPL-MCNC: 86 MG/DL (ref 65–140)
GLUCOSE UR STRIP-MCNC: NEGATIVE MG/DL
HCT VFR BLD AUTO: 38.2 % (ref 34.8–46.1)
HGB BLD-MCNC: 11.8 G/DL (ref 11.5–15.4)
HGB UR QL STRIP.AUTO: ABNORMAL
IMM GRANULOCYTES # BLD AUTO: 0.07 THOUSAND/UL (ref 0–0.2)
IMM GRANULOCYTES NFR BLD AUTO: 1 % (ref 0–2)
KETONES UR STRIP-MCNC: NEGATIVE MG/DL
LEUKOCYTE ESTERASE UR QL STRIP: NEGATIVE
LYMPHOCYTES # BLD AUTO: 1.58 THOUSANDS/ΜL (ref 0.6–4.47)
LYMPHOCYTES NFR BLD AUTO: 19 % (ref 14–44)
MCH RBC QN AUTO: 26.6 PG (ref 26.8–34.3)
MCHC RBC AUTO-ENTMCNC: 30.9 G/DL (ref 31.4–37.4)
MCV RBC AUTO: 86 FL (ref 82–98)
MONOCYTES # BLD AUTO: 0.57 THOUSAND/ΜL (ref 0.17–1.22)
MONOCYTES NFR BLD AUTO: 7 % (ref 4–12)
NEUTROPHILS # BLD AUTO: 5.83 THOUSANDS/ΜL (ref 1.85–7.62)
NEUTS SEG NFR BLD AUTO: 71 % (ref 43–75)
NITRITE UR QL STRIP: NEGATIVE
NON-SQ EPI CELLS URNS QL MICRO: ABNORMAL /HPF
NRBC BLD AUTO-RTO: 0 /100 WBCS
PH UR STRIP.AUTO: 5.5 [PH] (ref 4.5–8)
PLATELET # BLD AUTO: 375 THOUSANDS/UL (ref 149–390)
PMV BLD AUTO: 8.6 FL (ref 8.9–12.7)
POTASSIUM SERPL-SCNC: 3.6 MMOL/L (ref 3.5–5.3)
PROT SERPL-MCNC: 8.1 G/DL (ref 6.4–8.2)
PROT UR STRIP-MCNC: NEGATIVE MG/DL
RBC # BLD AUTO: 4.44 MILLION/UL (ref 3.81–5.12)
RBC #/AREA URNS AUTO: ABNORMAL /HPF
SODIUM SERPL-SCNC: 138 MMOL/L (ref 136–145)
SP GR UR STRIP.AUTO: 1.02 (ref 1–1.03)
TROPONIN I SERPL-MCNC: <0.02 NG/ML
UROBILINOGEN UR QL STRIP.AUTO: 0.2 E.U./DL
WBC # BLD AUTO: 8.21 THOUSAND/UL (ref 4.31–10.16)
WBC #/AREA URNS AUTO: ABNORMAL /HPF

## 2018-09-06 PROCEDURE — 85025 COMPLETE CBC W/AUTO DIFF WBC: CPT | Performed by: EMERGENCY MEDICINE

## 2018-09-06 PROCEDURE — 93005 ELECTROCARDIOGRAM TRACING: CPT

## 2018-09-06 PROCEDURE — 84484 ASSAY OF TROPONIN QUANT: CPT | Performed by: EMERGENCY MEDICINE

## 2018-09-06 PROCEDURE — 36415 COLL VENOUS BLD VENIPUNCTURE: CPT | Performed by: EMERGENCY MEDICINE

## 2018-09-06 PROCEDURE — 99285 EMERGENCY DEPT VISIT HI MDM: CPT

## 2018-09-06 PROCEDURE — 80053 COMPREHEN METABOLIC PANEL: CPT | Performed by: EMERGENCY MEDICINE

## 2018-09-06 PROCEDURE — 94640 AIRWAY INHALATION TREATMENT: CPT

## 2018-09-06 PROCEDURE — 71046 X-RAY EXAM CHEST 2 VIEWS: CPT

## 2018-09-06 PROCEDURE — 81001 URINALYSIS AUTO W/SCOPE: CPT

## 2018-09-06 PROCEDURE — 96374 THER/PROPH/DIAG INJ IV PUSH: CPT

## 2018-09-06 PROCEDURE — 81025 URINE PREGNANCY TEST: CPT | Performed by: EMERGENCY MEDICINE

## 2018-09-06 PROCEDURE — 96361 HYDRATE IV INFUSION ADD-ON: CPT

## 2018-09-06 RX ORDER — KETOROLAC TROMETHAMINE 30 MG/ML
15 INJECTION, SOLUTION INTRAMUSCULAR; INTRAVENOUS ONCE
Status: COMPLETED | OUTPATIENT
Start: 2018-09-06 | End: 2018-09-06

## 2018-09-06 RX ORDER — NAPROXEN 500 MG/1
500 TABLET ORAL 2 TIMES DAILY WITH MEALS
Qty: 10 TABLET | Refills: 0 | Status: SHIPPED | OUTPATIENT
Start: 2018-09-06 | End: 2018-10-16 | Stop reason: SDUPTHER

## 2018-09-06 RX ADMIN — KETOROLAC TROMETHAMINE 15 MG: 30 INJECTION, SOLUTION INTRAMUSCULAR at 18:51

## 2018-09-06 RX ADMIN — IPRATROPIUM BROMIDE 0.5 MG: 0.5 SOLUTION RESPIRATORY (INHALATION) at 18:52

## 2018-09-06 RX ADMIN — SODIUM CHLORIDE 1000 ML: 0.9 INJECTION, SOLUTION INTRAVENOUS at 18:50

## 2018-09-06 RX ADMIN — ALBUTEROL SULFATE 5 MG: 2.5 SOLUTION RESPIRATORY (INHALATION) at 18:52

## 2018-09-06 NOTE — ED ATTENDING ATTESTATION
Naomi Pierce MD, saw and evaluated the patient  I have discussed the patient with the resident/non-physician practitioner and agree with the resident's/non-physician practitioner's findings, Plan of Care, and MDM as documented in the resident's/non-physician practitioner's note, except where noted  All available labs and Radiology studies were reviewed  At this point I agree with the current assessment done in the Emergency Department  I have conducted an independent evaluation of this patient a history and physical is as follows:    30 YO female presents with chest tightness  States this is B/L, had some difficulty with breathing overnight  States tightness is 3/10, worse with breathing  Having diffuse myalgias since this morning  She did start to lose her voice last night  Denies exogenous hormones  Gen: Pt is in NAD  HEENT: Head is atraumatic, EOM's intact, neck has FROM, no oropharyngeal exudates  Chest: CTAB, Tender in the chest wall  Heart: RRR  Abdomen: Soft, NT/ND  Musculoskeletal: FROM in all extremities  Skin: No rash, no ecchymosis  Neuro: Awake, alert, oriented x4; Cranial nerves II-XII intact  Psych: Normal affect    MDM - Low risk Well's, PERC negative  ECG ok, will check CXR, troponin, electrolytes, CBC  Will try breathing Tx        Critical Care Time  CritCare Time    Procedures

## 2018-09-06 NOTE — ED PROVIDER NOTES
History  Chief Complaint   Patient presents with    Chest Pain     Pt states chest pain on b/l sided of chest and chest tightness since this morning  Pt states she lost her voice also and is having generalized boday aches  Pt states she feels like she cannot take a deep breath  Pt deneis fevers/nausea/vomiting      HPI     29year old female presenting with chief complaint of chest pain losing voice myalgias  Patient has a history of anxiety hypothyroidism  Patient states that yesterday she began to lose her voice  Patient states that is progressing becoming worse  She admits to bilateral chest tightness and pain  This began this morning began slowly  Currently pain is a 5/10  It is made worse with taking deep breath  Pain does not radiate  Patient admits to diffuse myalgias 2  Patient admits to fatigue  Patient has never had anything like this before  Patient denies history of prior VTE  Patient is not on hormones  Patient denies recent long trips  No family history of sudden cardiac death  Family history is negative for ACS  Patient denies fever chills rigors headache lightheadedness dizziness neck pain neck stiffness palpitations cough hemoptysis abdominal pain nausea vomiting diarrhea constipation urinary symptoms motor weakness numbness and tingling  Prior to Admission Medications   Prescriptions Last Dose Informant Patient Reported?  Taking?   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       Past Surgical History:   Procedure Laterality Date    COLPOSCOPY  01/2013    LASIK      Last Assessed - 09 May 2016    SALPINGECTOMY Bilateral 05/2015    Scope bs    TOOTH EXTRACTION      TUBAL LIGATION         Family History   Problem Relation Age of Onset    Anxiety disorder Mother     Dementia Maternal Grandmother         Alzheimer's    Colon cancer Maternal Grandmother     Hypertension Maternal Grandmother     Hypertension Paternal Grandmother     Diabetes Paternal Grandfather      I have reviewed and agree with the history as documented  Social History   Substance Use Topics    Smoking status: Never Smoker    Smokeless tobacco: Never Used    Alcohol use Yes      Comment: occ        Review of Systems   Constitutional: Positive for fatigue  Negative for chills and fever  HENT: Positive for voice change  Negative for congestion, dental problem, drooling, ear discharge, ear pain, facial swelling, hearing loss, mouth sores, nosebleeds, postnasal drip, rhinorrhea, sinus pain and sinus pressure  Respiratory: Positive for chest tightness and shortness of breath  Negative for apnea, cough and choking  Cardiovascular: Positive for chest pain  All other systems reviewed and are negative  Physical Exam  ED Triage Vitals   Temperature Pulse Respirations Blood Pressure SpO2   09/06/18 1814 09/06/18 1813 09/06/18 1813 09/06/18 1813 09/06/18 1813   98 3 °F (36 8 °C) 79 16 128/79 100 %      Temp Source Heart Rate Source Patient Position - Orthostatic VS BP Location FiO2 (%)   09/06/18 1814 09/06/18 1813 09/06/18 1813 09/06/18 1813 --   Oral Monitor Sitting Right arm       Pain Score       09/06/18 1813       No Pain           Orthostatic Vital Signs  Vitals:    09/06/18 1813 09/06/18 1941   BP: 128/79 121/74   Pulse: 79 74   Patient Position - Orthostatic VS: Sitting Sitting       Physical Exam   Constitutional: She is oriented to person, place, and time  She appears well-developed and well-nourished  No distress  HENT:   Head: Normocephalic and atraumatic   Head is without right periorbital erythema and without left periorbital erythema  Right Ear: Hearing, tympanic membrane, external ear and ear canal normal  No mastoid tenderness  Tympanic membrane is not injected, not scarred, not perforated, not erythematous and not retracted  No hemotympanum  Left Ear: Hearing, tympanic membrane, external ear and ear canal normal  No mastoid tenderness  Tympanic membrane is not injected, not scarred, not perforated, not erythematous and not retracted  No hemotympanum  Nose: Nose normal  No mucosal edema, rhinorrhea, nose lacerations, septal deviation or nasal septal hematoma  Right sinus exhibits no maxillary sinus tenderness and no frontal sinus tenderness  Left sinus exhibits no maxillary sinus tenderness and no frontal sinus tenderness  Mouth/Throat: Uvula is midline, oropharynx is clear and moist and mucous membranes are normal  She does not have dentures  No oral lesions  No trismus in the jaw  Normal dentition  No dental abscesses, uvula swelling, lacerations or dental caries  No oropharyngeal exudate  Tonsils are 0 on the right  Tonsils are 0 on the left  No tonsillar exudate  Eyes: Conjunctivae and EOM are normal  Pupils are equal, round, and reactive to light  Right eye exhibits no discharge  Left eye exhibits no discharge  No scleral icterus  Neck: Normal range of motion  Neck supple  No JVD present  No tracheal deviation present  No thyromegaly present  Cardiovascular: Normal rate, regular rhythm, normal heart sounds and intact distal pulses  No murmur heard  Pulmonary/Chest: Effort normal and breath sounds normal  No stridor  No respiratory distress  She has no wheezes  Abdominal: Soft  Bowel sounds are normal  She exhibits no distension and no mass  There is no tenderness  There is no rebound and no guarding  No hernia  Musculoskeletal: Normal range of motion  She exhibits no edema, tenderness or deformity     Homans sign negative bilaterally no calf erythema or tenderness, calves are symmetrical bilaterally  Lymphadenopathy:     She has no cervical adenopathy  Neurological: She is alert and oriented to person, place, and time  She displays normal reflexes  No cranial nerve deficit  She exhibits normal muscle tone  Skin: Skin is warm and dry  No rash noted  She is not diaphoretic  No erythema  Psychiatric: She has a normal mood and affect  Her behavior is normal  Judgment and thought content normal    Nursing note and vitals reviewed        ED Medications  Medications   sodium chloride 0 9 % bolus 1,000 mL (0 mL Intravenous Stopped 9/6/18 1955)   ketorolac (TORADOL) injection 15 mg (15 mg Intravenous Given 9/6/18 1851)   albuterol inhalation solution 5 mg (5 mg Nebulization Given 9/6/18 1852)   ipratropium (ATROVENT) 0 02 % inhalation solution 0 5 mg (0 5 mg Nebulization Given 9/6/18 1852)       Diagnostic Studies  Results Reviewed     Procedure Component Value Units Date/Time    Urine Microscopic [[id number]]  (Abnormal) Collected:  09/06/18 1858    Lab Status:  Final result Specimen:  Urine from Urine, Clean Catch Updated:  09/06/18 1939     RBC, UA None Seen /hpf      WBC, UA 0-1 (A) /hpf      Epithelial Cells Occasional /hpf      Bacteria, UA None Seen /hpf     Troponin I [[id number]]  (Normal) Collected:  09/06/18 1850    Lab Status:  Final result Specimen:  Blood from Arm, Left Updated:  09/06/18 1921     Troponin I <0 02 ng/mL     Comprehensive metabolic panel [[id number]] Collected:  09/06/18 1850    Lab Status:  Final result Specimen:  Blood from Arm, Left Updated:  09/06/18 1918     Sodium 138 mmol/L      Potassium 3 6 mmol/L      Chloride 103 mmol/L      CO2 28 mmol/L      ANION GAP 7 mmol/L      BUN 19 mg/dL      Creatinine 0 69 mg/dL      Glucose 86 mg/dL      Calcium 9 4 mg/dL      AST 24 U/L      ALT 48 U/L      Alkaline Phosphatase 110 U/L      Total Protein 8 1 g/dL      Albumin 3 6 g/dL      Total Bilirubin 0 26 mg/dL      eGFR 116 ml/min/1 73sq m     Narrative:         National Kidney Disease Education Program recommendations are as follows:  GFR calculation is accurate only with a steady state creatinine  Chronic Kidney disease less than 60 ml/min/1 73 sq  meters  Kidney failure less than 15 ml/min/1 73 sq  meters      CBC and differential [[id number]]  (Abnormal) Collected:  09/06/18 1850    Lab Status:  Final result Specimen:  Blood from Arm, Left Updated:  09/06/18 1902     WBC 8 21 Thousand/uL      RBC 4 44 Million/uL      Hemoglobin 11 8 g/dL      Hematocrit 38 2 %      MCV 86 fL      MCH 26 6 (L) pg      MCHC 30 9 (L) g/dL      RDW 15 0 %      MPV 8 6 (L) fL      Platelets 334 Thousands/uL      nRBC 0 /100 WBCs      Neutrophils Relative 71 %      Immat GRANS % 1 %      Lymphocytes Relative 19 %      Monocytes Relative 7 %      Eosinophils Relative 2 %      Basophils Relative 0 %      Neutrophils Absolute 5 83 Thousands/µL      Immature Grans Absolute 0 07 Thousand/uL      Lymphocytes Absolute 1 58 Thousands/µL      Monocytes Absolute 0 57 Thousand/µL      Eosinophils Absolute 0 14 Thousand/µL      Basophils Absolute 0 02 Thousands/µL     POCT urinalysis dipstick [[id number]]  (Abnormal) Resulted:  09/06/18 1851    Lab Status:  Final result Updated:  09/06/18 1851    POCT pregnancy, urine [[id number]]  (Normal) Resulted:  09/06/18 1851    Lab Status:  Final result Updated:  09/06/18 1851     EXT PREG TEST UR (Ref: Negative) neg    ED Urine Macroscopic [[id number]]  (Abnormal) Collected:  09/06/18 1858    Lab Status:  Final result Specimen:  Urine Updated:  09/06/18 1849     Color, UA Yellow     Clarity, UA Clear     pH, UA 5 5     Leukocytes, UA Negative     Nitrite, UA Negative     Protein, UA Negative mg/dl      Glucose, UA Negative mg/dl      Ketones, UA Negative mg/dl      Urobilinogen, UA 0 2 E U /dl      Bilirubin, UA Negative     Blood, UA Trace (A)     Specific [place], UA 1 020    Narrative:       CLINITEK RESULT                 XR chest 2 views   ED Interpretation by Judy Paige DO (09/06 1948)   No acute findings            Procedures  ECG 12 Lead Documentation  Date/Time: 9/6/2018 7:45 PM  Performed by: Jannet Miranda  Authorized by: Jannet Miranda     Indications / Diagnosis:  Chest pain  Comments:      Normal sinus rhythm  Vent   rate 80 BPM  WA interval 118 ms  QRS duration 84 ms  QT/QTc 356/410 ms  P-R-T axes 66 50 14          Phone Consults  ED Phone Contact    ED Course         HEART Risk Score      Most Recent Value   History  0 Filed at: 09/06/2018 2232   ECG  0 Filed at: 09/06/2018 2232   Age  0 Filed at: 09/06/2018 2232   Risk Factors  0 Filed at: 09/06/2018 2232   Troponin  0 Filed at: 09/06/2018 2232   Heart Score Risk Calculator   History  0 Filed at: 09/06/2018 2232   ECG  0 Filed at: 09/06/2018 2232   Age  0 Filed at: 09/06/2018 2232   Risk Factors  0 Filed at: 09/06/2018 2232   Troponin  0 Filed at: 09/06/2018 2232   HEART Score  0 Filed at: 09/06/2018 2232   HEART Score  0 Filed at: 09/06/2018 2232            PERC Rule for PE      Most Recent Value   PERC Rule for PE   Age >=50  0 Filed at: 09/06/2018 2233   HR >=100  0 Filed at: 09/06/2018 2233   O2 Sat on room air < 95%  0 Filed at: 09/06/2018 2233   History of PE or DVT  0 Filed at: 09/06/2018 2233   Recent trauma or surgery  0 Filed at: 09/06/2018 2233   Hemoptysis  0 Filed at: 09/06/2018 2233   Exogenous estrogen  0 Filed at: 09/06/2018 2233   Unilateral leg swelling  0 Filed at: 09/06/2018 2233   PERC Rule for PE Results  0 Filed at: 09/06/2018 2233                Wells' Criteria for PE      Most Recent Value   Wells' Criteria for PE   Clinical signs and symptoms of DVT  0 Filed at: 09/06/2018 2233   PE is primary diagnosis or equally likely  0 Filed at: 09/06/2018 2233   HR >100  0 Filed at: 09/06/2018 2233   Immobilization at least 3 days or Surgery in the previous 4 weeks  0 Filed at: 09/06/2018/06/2018 2233   Previous, objectively diagnosed PE or DVT  0 Filed at: 09/06/2018 2233   Hemoptysis  0 Filed at: 09/06/2018 2233 Malignancy with treatment within 6 months or palliative  0 Filed at: 09/06/2018 2233   Lei Needle' Criteria Total  0 Filed at: 09/06/2018 2233            [facility]  Number of Diagnoses or Management Options  Chest pain:   Fatigue:   Laryngitis:   Diagnosis management comments: 44-year-old female presenting with fatigue chest pain and loss of voice  On exam vital signs are normal  Patient has no signs of DVT  EKG nonischemic, troponin negative, heart score 0  Patient is perc negative Wells low risk doubt ACS doubt pulmonary embolism  Patient has lost her voice, pharyngitis  Chest x-ray unremarkable, no pneumonia or pneumothorax  Patient also has fatigue  Possible viral syndrome  Patient treated supportively  Patient felt better  Patient is to follow-up care  ED return precautions discussed  Patient agrees to follow-up care in the community  Impression chest pain, fatigue, laryngitis  CritCare Time    Disposition  Final diagnoses:   Chest pain   Fatigue   Laryngitis     Time reflects when diagnosis was documented in both MDM as applicable and the Disposition within this note     Time User Action Codes Description Comment    9/6/2018  7:48 PM Yara Reis Add [R07 9] Chest pain     9/6/2018  7:48 PM Fredo Chaudhari Add [R53 83] Fatigue     9/6/2018 10:34 PM Yara Reis Add [J04 0] Laryngitis       ED Disposition     ED Disposition Condition Comment    Discharge  [facility] discharge to home/self care  Condition at discharge: Good    Return precautions were discussed with patient  Patient understands when to return to  Emergency department  Patient agrees to discharge plan and follow up care              Follow-up Information     Follow up With Specialties Details Why Contact Info Additional Information    Trung Campos MD Pediatrics Go in 2 days  [facility] [id number]  [facility] [address] Emergency Department Emergency Medicine Go to As needed, If symptoms worsen [address] [facility]  [phone] [place] ED, [address] [facility] [address]  , [place], [place], [address]          Discharge Medication List as of 9/6/2018  7:49 PM      START taking these medications    Details   naproxen (NAPROSYN) 500 mg tablet Take 1 tablet (500 mg total) by mouth 2 (two) times a day with meals for 5 days, Starting Thu 9/6/2018, Until Tue 9/11/2018, Print         CONTINUE these medications which have NOT CHANGED    Details   ergocalciferol (VITAMIN D2) 50,000 units Take 1 capsule by mouth once a week, Starting Sat 2/6/2016, Historical Med           No discharge procedures on file  ED Provider  Attending physically available and evaluated [facility] managed the patient along with the ED Attending      Electronically Signed by         Albina Varma DO  09/06/18 5004

## 2018-09-06 NOTE — DISCHARGE INSTRUCTIONS

## 2018-09-07 LAB
ATRIAL RATE: 80 BPM
P AXIS: 66 DEGREES
PR INTERVAL: 118 MS
QRS AXIS: 50 DEGREES
QRSD INTERVAL: 84 MS
QT INTERVAL: 356 MS
QTC INTERVAL: 410 MS
T WAVE AXIS: 14 DEGREES
VENTRICULAR RATE: 80 BPM

## 2018-09-07 PROCEDURE — 93010 ELECTROCARDIOGRAM REPORT: CPT | Performed by: INTERNAL MEDICINE

## 2018-10-16 ENCOUNTER — OFFICE VISIT (OUTPATIENT)
Dept: FAMILY MEDICINE CLINIC | Facility: CLINIC | Age: 33
End: 2018-10-16
Payer: COMMERCIAL

## 2018-10-16 VITALS
RESPIRATION RATE: 18 BRPM | TEMPERATURE: 97 F | WEIGHT: 170 LBS | SYSTOLIC BLOOD PRESSURE: 110 MMHG | HEIGHT: 60 IN | HEART RATE: 88 BPM | OXYGEN SATURATION: 99 % | BODY MASS INDEX: 33.38 KG/M2 | DIASTOLIC BLOOD PRESSURE: 70 MMHG

## 2018-10-16 DIAGNOSIS — M54.50 LOW BACK PAIN WITHOUT SCIATICA, UNSPECIFIED BACK PAIN LATERALITY, UNSPECIFIED CHRONICITY: ICD-10-CM

## 2018-10-16 DIAGNOSIS — R22.9 SKIN MASS: Primary | ICD-10-CM

## 2018-10-16 PROCEDURE — 99051 MED SERV EVE/WKEND/HOLIDAY: CPT | Performed by: PHYSICIAN ASSISTANT

## 2018-10-16 PROCEDURE — 99214 OFFICE O/P EST MOD 30 MIN: CPT | Performed by: PHYSICIAN ASSISTANT

## 2018-10-16 RX ORDER — LURASIDONE HYDROCHLORIDE 20 MG/1
20 TABLET, FILM COATED ORAL
Refills: 0 | COMMUNITY
Start: 2018-09-12 | End: 2019-03-05 | Stop reason: ALTCHOICE

## 2018-10-16 RX ORDER — FLUOXETINE HYDROCHLORIDE 20 MG/1
20 CAPSULE ORAL EVERY MORNING
Refills: 0 | COMMUNITY
Start: 2018-09-12 | End: 2019-03-05 | Stop reason: ALTCHOICE

## 2018-10-16 RX ORDER — CYCLOBENZAPRINE HCL 10 MG
10 TABLET ORAL 3 TIMES DAILY PRN
Qty: 30 TABLET | Refills: 0 | Status: SHIPPED | OUTPATIENT
Start: 2018-10-16 | End: 2019-03-05 | Stop reason: ALTCHOICE

## 2018-10-16 RX ORDER — NAPROXEN 500 MG/1
500 TABLET ORAL 2 TIMES DAILY WITH MEALS
Qty: 60 TABLET | Refills: 1 | Status: SHIPPED | OUTPATIENT
Start: 2018-10-16 | End: 2019-03-09 | Stop reason: ALTCHOICE

## 2018-10-16 NOTE — PROGRESS NOTES
Assessment/Plan:    Patient Instructions   Proceed with physical therapy for low back pain  Importance of doing the exercises at home has also been discussed  Take naproxen as needed but avoid long-term use since on fluoxetine and a combination of the 2 medications can increase bleeding  Take cyclobenzaprine as needed but avoid the medication of working or driving because the drowsy side effects  Moist heat to the low back 3 times daily for 10 min as needed  Ultrasound of left buttock mass  Refer to Dr Nydia Turner, general surgery  Follow-up if any symptoms increase or there is no improvement of back pain with physical therapy over the next 8 weeks  Patient states that her Pap smear was completed about 1 year ago with Dr Javier Lu  Message sent to [facility] to retrieve her results    M*Sk[employer] software was used to dictate this note  It may contain errors with dictating incorrect words/spelling  Please contact provider directly for any questions  Diagnoses and all orders for this visit:    Skin mass  -     US abdomen complete; Future  -     Ambulatory referral to General Surgery; Future    Low back pain without sciatica, unspecified back pain laterality, unspecified chronicity  -     Ambulatory referral to Physical Therapy; Future  -     cyclobenzaprine (FLEXERIL) 10 mg tablet; Take 1 tablet (10 mg total) by mouth 3 (three) times a day as needed for muscle spasms  -     naproxen (NAPROSYN) 500 mg tablet; Take 1 tablet (500 mg total) by mouth 2 (two) times a day with meals for 5 days    Other orders  -     FLUoxetine (PROzac) 20 mg capsule; Take 20 mg by mouth every morning  -     LATUDA 20 MG tablet; Take 20 mg by mouth daily with breakfast          Subjective:      Patient ID: Amadeo Kolb is a 29 y o  female  Patient presents today for evaluation of low back pain that started about 4 days ago  She notices of the lower mid back region  She denies any specific injury    She states over the weekend she was actually doing some cleaning and by the end of the day she noticed some discomfort  She denies any numbness or tingling or radiation of pain down her lower extremities  She denies any loss of bladder or bowel control  She has been taking ibuprofen 400 mg to 600 mg as needed with some relief  She has had intermittent back pain for quite a while  She has never had any physical therapy for her pain  She also complains of a persistent lump of the left buttock  She fell about 2 years ago and has had the lump ever since  She denies any increase in size  She denies any pain  She states that she notices it with sitting and is apparent depending on the type of clothing she wears  The following portions of the patient's history were reviewed and updated as appropriate:   She  has a past medical history of Anxiety; ASCUS with positive high risk HPV cervical (03/2008); Chicken pox; Depression; Disease of thyroid gland; Habitual aborter not currently pregnant; and LGSIL of cervix of undetermined significance (12/2012)  She   Patient Active Problem List    Diagnosis Date Noted    Skin mass 10/16/2018    Low back pain 10/16/2018    Left foot pain 07/24/2018    Bipolar disorder, current episode manic without psychotic features ([facility] [address] ) 11/20/2016    Mild vitamin D deficiency 02/06/2016    Depression 02/03/2016    Fatigue 02/03/2016    Back pain, chronic 01/06/2015     She  has a past surgical history that includes Tubal ligation; Colposcopy (01/2013); LASIK; Salpingectomy (Bilateral, 05/2015); and Tooth extraction  Her family history includes Anxiety disorder in her mother; Colon cancer in her maternal grandmother; Dementia in her maternal grandmother; Diabetes in her paternal grandfather; Hypertension in her maternal grandmother and paternal grandmother  She  reports that she has never smoked  She has never used smokeless tobacco  She reports that she drinks alcohol   She reports that she does not use drugs  Current Outpatient Prescriptions   Medication Sig Dispense Refill    cyclobenzaprine (FLEXERIL) 10 mg tablet Take 1 tablet (10 mg total) by mouth 3 (three) times a day as needed for muscle spasms 30 tablet 0    ergocalciferol (VITAMIN D2) 50,000 units Take 1 capsule by mouth once a week      FLUoxetine (PROzac) 20 mg capsule Take 20 mg by mouth every morning  0    LATUDA 20 MG tablet Take 20 mg by mouth daily with breakfast  0    naproxen (NAPROSYN) 500 mg tablet Take 1 tablet (500 mg total) by mouth 2 (two) times a day with meals for 5 days 60 tablet 1     No current facility-administered medications for this visit  Current Outpatient Prescriptions on File Prior to Visit   Medication Sig    ergocalciferol (VITAMIN D2) 50,000 units Take 1 capsule by mouth once a week    [DISCONTINUED] naproxen (NAPROSYN) 500 mg tablet Take 1 tablet (500 mg total) by mouth 2 (two) times a day with meals for 5 days     No current facility-administered medications on file prior to visit  She has No Known Allergies       Review of Systems   Musculoskeletal:        As stated in HPI   Skin:        As stated in HPI   Neurological: Negative for numbness  Objective:      /70   Pulse 88   Temp (!) 97 °F (36 1 °C) (Tympanic)   Resp 18   Ht 5' (1 524 m)   Wt 77 1 kg (170 lb)   LMP 09/21/2018   SpO2 99%   BMI 33 20 kg/m²          Physical Exam   Constitutional: She appears well-developed and well-nourished  No distress  HENT:   Head: Normocephalic and atraumatic  Right Ear: External ear normal    Left Ear: External ear normal    Mouth/Throat: Oropharynx is clear and moist    Neck: Neck supple  Cardiovascular: Normal rate, regular rhythm and normal heart sounds  No murmur heard  Pulmonary/Chest: Effort normal and breath sounds normal  No respiratory distress  She has no wheezes  She has no rales     Musculoskeletal:   Lumbar spine:  No specific tenderness, good forward flexion with some pain  Negative straight leg raise bilaterally  Strength of the lower extremities not including EHL's is 5+/5 and equal bilaterally  Reflexes are 1+ at the knees  0 at the ankles  Lymphadenopathy:     She has no cervical adenopathy  Neurological: She is alert  Skin: Skin is warm  Left buttock:  About a 8 x 6 cm fluctuant mass which is nontender  Psychiatric: She has a normal mood and affect

## 2018-10-16 NOTE — PATIENT INSTRUCTIONS
Proceed with physical therapy for low back pain  Importance of doing the exercises at home has also been discussed  Take naproxen as needed but avoid long-term use since on fluoxetine and a combination of the 2 medications can increase bleeding  Take cyclobenzaprine as needed but avoid the medication of working or driving because the drowsy side effects  Moist heat to the low back 3 times daily for 10 min as needed  Ultrasound of left buttock mass  Refer to Dr Bertin Daniels, general surgery  Follow-up if any symptoms increase or there is no improvement of back pain with physical therapy over the next 8 weeks

## 2018-10-22 ENCOUNTER — TRANSCRIBE ORDERS (OUTPATIENT)
Dept: ADMINISTRATIVE | Facility: HOSPITAL | Age: 33
End: 2018-10-22

## 2018-10-22 ENCOUNTER — TELEPHONE (OUTPATIENT)
Dept: FAMILY MEDICINE CLINIC | Facility: CLINIC | Age: 33
End: 2018-10-22

## 2018-10-23 ENCOUNTER — EVALUATION (OUTPATIENT)
Dept: PHYSICAL THERAPY | Facility: CLINIC | Age: 33
End: 2018-10-23
Payer: COMMERCIAL

## 2018-10-23 DIAGNOSIS — R22.9 SKIN MASS: Primary | ICD-10-CM

## 2018-10-23 DIAGNOSIS — M54.50 LOW BACK PAIN WITHOUT SCIATICA, UNSPECIFIED BACK PAIN LATERALITY, UNSPECIFIED CHRONICITY: Primary | ICD-10-CM

## 2018-10-23 PROCEDURE — 97161 PT EVAL LOW COMPLEX 20 MIN: CPT | Performed by: PHYSICAL THERAPIST

## 2018-10-23 PROCEDURE — G8991 OTHER PT/OT GOAL STATUS: HCPCS | Performed by: PHYSICAL THERAPIST

## 2018-10-23 PROCEDURE — 97140 MANUAL THERAPY 1/> REGIONS: CPT | Performed by: PHYSICAL THERAPIST

## 2018-10-23 PROCEDURE — G8990 OTHER PT/OT CURRENT STATUS: HCPCS | Performed by: PHYSICAL THERAPIST

## 2018-10-23 PROCEDURE — 97110 THERAPEUTIC EXERCISES: CPT | Performed by: PHYSICAL THERAPIST

## 2018-10-23 NOTE — TELEPHONE ENCOUNTER
Per US tech in [place] you should be able to put in [facility] to find the below exam   She says that is the exact US for what you are looking for

## 2018-10-23 NOTE — PROGRESS NOTES
PT Evaluation     Today's date: 10/23/2018  Patient name: Tom Marrero  : 1985  MRN: [id number]  Referring provider: Radha Scanlon PA-C  Dx:   Encounter Diagnosis     ICD-10-CM    1  Low back pain without sciatica, unspecified back pain laterality, unspecified chronicity M54 5 Ambulatory referral to Physical Therapy                  Assessment  Impairments: abnormal coordination, abnormal or restricted ROM, activity intolerance, impaired physical strength and pain with function    Assessment details: Pt is a pleasant 29 y o  female presenting to outpatient physical therapy with Low back pain without sciatica, unspecified back pain laterality, unspecified chronicity  (primary encounter diagnosis)   Pt presents with pain, decreased range of motion, decreased strength, and decreased tolerance to activity  Displays probable movement impairment diagnosis of lumbar hypomobility dysfunction  Patient meets classification criteria for lumbar mobilization category per [facility]  Issued updated HEP  Pt is a good candidate for outpatient physical therapy and would benefit from skilled physical therapy to address limitations and to achieve goals  Thank you for this referral    Understanding of Dx/Px/POC: good   Prognosis: good    Goals  ST  Patient will report 25% decrease in pain in 4 weeks  2  Patient will demonstrate 25% improvement in ROM in 4 weeks  3  Patient will demonstrate 1/2 grade improvement in strength in 4 weeks  LT  Patient will be able to perform IADLS without restriction or pain by discharge  2  Patient will be independent in HEP by discharge  3  Patient will be able to return to recreational/work duties without restriction or pain by discharge        Plan  Patient would benefit from: PT eval and skilled PT  Planned modality interventions: cryotherapy and thermotherapy: hydrocollator packs  Planned therapy interventions: IADL retraining, body mechanics training, flexibility, functional ROM exercises, home exercise program, neuromuscular re-education, manual therapy, postural training, strengthening, stretching, therapeutic activities, therapeutic exercise and joint mobilization  Frequency: 1x week (1-2x/week)  Duration in visits: 4  Duration in weeks: 2  Treatment plan discussed with: patient        Subjective Evaluation    History of Present Illness  Mechanism of injury: Pt reports onset of significant low back pain 8 days ago, of unknown etiology  Notes only thing she can attribute symptoms to is working the day before  Notes she followed up with PCP on 10/16, where she was prescribed medications and referred to PT  Notes pain has improved since onset, however, notes she continues to have discomfort when bending forward  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, noting she has modified bending mechanics and requested help from coworkers at times  Denies symptoms radiating symptoms to LE or buttocks  Denies numbness, tingling, or paresthesias  Reports she has left calf pain that has been persistent over the past 2-months  No diagnostic scans  No follow up scheduled with PCP at present time  Pain  Current pain ratin  At best pain ratin  At worst pain ratin  Location: throughout lumbar region, bilat or unilaterally       Diagnostic Tests  No diagnostic tests performed  Patient Goals  Patient goals for therapy: decreased pain, increased motion, return to work, independence with ADLs/IADLs and return to sport/leisure activities  Patient goal: decrease pain        Objective     Palpation   Left   No palpable tenderness to the erector spinae, lumbar paraspinals and quadratus lumborum  Right   No palpable tenderness to the erector spinae, lumbar paraspinals and quadratus lumborum  Tenderness     Lumbar Spine  No tenderness in the spinous process       Active Range of Motion     Lumbar   Flexion: 70 degrees   Extension: 30 degrees with pain  Left lateral flexion: 25 degrees   Right lateral flexion: 25 degrees     Additional Active Range of Motion Details  LUMBAR AROM -  Flexion, extension, bilat lateral flexion measured with bubble inclinometer at L1; Rotation measured with goniometer and patient seated      Passive Range of Motion     Additional Passive Range of Motion Details  10/23: Passive hip ROM symmetrical and WFL bilat    Tests     Lumbar     Left   Negative passive SLR  Right   Negative passive SLR       Additional Tests Details  10/23: PA mobility testing - pain noted at L5, mild-mod hypomobility L3-5      Flowsheet Rows      Most Recent Value   PT/OT G-Codes   Current Score  67   Projected Score  69   Assessment Type  Evaluation   G code set  Other PT/OT Primary   Other PT Primary Current Status ()  CJ   Other PT Primary Goal Status ()  CJ          Precautions: n/a    Daily Treatment Diary     Manual  10/23            LS sidelying roll mobs perf JOSEPH Gr IV-V            LS supine LF/rot mobs perf JOSEPH Gr IV-V                                                       Exercise Diary  10/23            bike                          LTR 15"x5            DKTC 15"x5            TrA isometric                          Quad HTH str  3-way 15"x5 ea            Cat/camel                          Seated lumbar roll outs             Seated H/S str                          TB sh  Flex              TB sh  Ext                                                                                                Modalities

## 2018-10-25 ENCOUNTER — HOSPITAL ENCOUNTER (OUTPATIENT)
Dept: ULTRASOUND IMAGING | Facility: HOSPITAL | Age: 33
Discharge: HOME | End: 2018-10-25
Payer: COMMERCIAL

## 2018-10-25 DIAGNOSIS — R22.9 SKIN MASS: ICD-10-CM

## 2018-10-25 PROCEDURE — 76705 ECHO EXAM OF ABDOMEN: CPT

## 2018-10-26 ENCOUNTER — TELEPHONE (OUTPATIENT)
Dept: FAMILY MEDICINE CLINIC | Facility: CLINIC | Age: 33
End: 2018-10-26

## 2018-10-29 ENCOUNTER — TELEPHONE (OUTPATIENT)
Dept: FAMILY MEDICINE CLINIC | Facility: CLINIC | Age: 33
End: 2018-10-29

## 2018-11-01 ENCOUNTER — OFFICE VISIT (OUTPATIENT)
Dept: PHYSICAL THERAPY | Facility: CLINIC | Age: 33
End: 2018-11-01
Payer: COMMERCIAL

## 2018-11-01 ENCOUNTER — OFFICE VISIT (OUTPATIENT)
Dept: SURGERY | Facility: CLINIC | Age: 33
End: 2018-11-01
Payer: COMMERCIAL

## 2018-11-01 VITALS
DIASTOLIC BLOOD PRESSURE: 78 MMHG | TEMPERATURE: 98.1 F | HEIGHT: 60 IN | WEIGHT: 171 LBS | RESPIRATION RATE: 14 BRPM | SYSTOLIC BLOOD PRESSURE: 118 MMHG | BODY MASS INDEX: 33.57 KG/M2 | HEART RATE: 82 BPM

## 2018-11-01 DIAGNOSIS — R22.9 SKIN MASS: Primary | ICD-10-CM

## 2018-11-01 DIAGNOSIS — M54.50 LOW BACK PAIN WITHOUT SCIATICA, UNSPECIFIED BACK PAIN LATERALITY, UNSPECIFIED CHRONICITY: Primary | ICD-10-CM

## 2018-11-01 PROCEDURE — 97140 MANUAL THERAPY 1/> REGIONS: CPT | Performed by: PHYSICAL THERAPIST

## 2018-11-01 PROCEDURE — 99243 OFF/OP CNSLTJ NEW/EST LOW 30: CPT | Performed by: SURGERY

## 2018-11-01 PROCEDURE — 97530 THERAPEUTIC ACTIVITIES: CPT | Performed by: PHYSICAL THERAPIST

## 2018-11-01 PROCEDURE — 97110 THERAPEUTIC EXERCISES: CPT | Performed by: PHYSICAL THERAPIST

## 2018-11-01 NOTE — LETTER
November 1, 2018     Savita Romero PA-C  [facility]  [address]    Patient: Van Mead   YOB: 1985   Date of Visit: 11/1/2018       Dear Dr Francesca Melchor: Thank you for referring Van Mead to me for evaluation  Below are my notes for this consultation  If you have questions, please do not hesitate to call me  I look forward to following your patient along with you  Sincerely,        Darius Mixon MD        CC: No Recipients  Toñito Grajeda  11/1/2018  3:41 PM  Sign at close encounter  Assessment/Plan:   Van Mead is a 29 y  o female who is here for The encounter diagnosis was Skin mass  Patient with mass of buttock for 2 years after a fall  On exam found to have Lipoma of the left butock  Plan: Excise lesion(s) under anesthesia in the operating room        - Patient has been instructed to avoid aspirin containing medications or non-steroidal anti-inflammatory drugs for SEVEN days preceding surgery  Preoperative Clearance: None      US IMPRESSION:     Although imaging characteristics are not pathognomonic, its ultrasound appearance is typical of lipoma  Recommend clinical followup for size and symptom stability  Differential includes chronic encapsulated posttraumatic fat necrosis, or less likely   chronic hematoma or other nonspecific soft tissue lesions    _______________________________________________________  CC: Mass (L  Buttock mass)    HPI:  Van Mead is a 29 y  o female who was referred for evaluation of Mass (L  Buttock mass)    Currently patient reports lump in left buttock  Intermittent pain  Patient states she noticed it after a fall       Reports: growing    ROS:  General ROS: negative  negative for - chills, fatigue, fever or night sweats, weight loss  Respiratory ROS: no cough, shortness of breath, or wheezing  Cardiovascular ROS: no chest pain or dyspnea on exertion  Genito-Urinary ROS: no dysuria, trouble voiding, or hematuria  Musculoskeletal ROS: negative for - gait disturbance, joint pain or muscle pain  Neurological ROS: no TIA or stroke symptoms  Skin ROS: See HPI  GI ROS: see HPI  Skin ROS: no new rashes or lesions   Lymphatic ROS: no new adenopathy noted by pt  GYN ROS: see HPI, no new GYN history or bleeding noted  Psy ROS: no new mental or behavioral disturbances       Patient Active Problem List   Diagnosis    Back pain, chronic    Bipolar disorder, current episode manic without psychotic features (HCC)    Depression    Fatigue    Mild vitamin D deficiency    Left foot pain    Skin mass    Low back pain         Allergies:  Patient has no known allergies        Current Outpatient Prescriptions:     cyclobenzaprine (FLEXERIL) 10 mg tablet, Take 1 tablet (10 mg total) by mouth 3 (three) times a day as needed for muscle spasms (Patient not taking: Reported on 11/1/2018 ), Disp: 30 tablet, Rfl: 0    ergocalciferol (VITAMIN D2) 50,000 units, Take 1 capsule by mouth once a week, Disp: , Rfl:     FLUoxetine (PROzac) 20 mg capsule, Take 20 mg by mouth every morning, Disp: , Rfl: 0    LATUDA 20 MG tablet, Take 20 mg by mouth daily with breakfast, Disp: , Rfl: 0    naproxen (NAPROSYN) 500 mg tablet, Take 1 tablet (500 mg total) by mouth 2 (two) times a day with meals for 5 days, Disp: 60 tablet, Rfl: 1    Past Medical History:   Diagnosis Date    Anxiety     ASCUS with positive high risk HPV cervical 03/2008    Chicken pox     Depression     Disease of thyroid gland     hypothyroid    Habitual aborter not currently pregnant     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       Past Surgical History:   Procedure Laterality Date    COLPOSCOPY  01/2013    LASIK      Last Assessed - 09 May 2016    SALPINGECTOMY Bilateral 05/2015    Scope bs    TOOTH EXTRACTION      TUBAL LIGATION         Family History   Problem Relation Age of Onset    Anxiety disorder Mother    Hakeem Trejo Dementia Maternal Grandmother         Alzheimer's    Colon cancer Maternal Grandmother     Hypertension Maternal Grandmother     Hypertension Paternal Grandmother     Diabetes Paternal Grandfather         reports that she has never smoked  She has never used smokeless tobacco  She reports that she drinks alcohol  She reports that she does not use drugs  PHYSICAL EXAM  General Appearance:    Alert, cooperative, no distress   Head:    Normocephalic without obvious abnormality   Eyes:    PERRL, conjunctiva/corneas clear, EOM's intact        Neck:   Supple, no adenopathy, no JVD   Back:     Symmetric, no spinal or CVA tenderness   Lungs:     Clear to auscultation bilaterally, no wheezing or rhonchi   Heart:    Regular rate and rhythm, S1 and S2 normal, no murmur   Abdomen:     Benign, no rebound or guarding  Extremities:   Extremities normal  No clubbing, cyanosis or edema   Psych:   Normal Affect, AOx3  Neurologic:  Skin:   CNII-XII intact  Strength symmetric, speech intact    Warm, dry, intact, no visible rashes or lesions except as follows: soft tissue mass of left buttock             Physical Exam   Skin:          Some portions of this record may have been generated with voice recognition software  There may be translation, syntax,  or grammatical errors  Occasional wrong word or "sound-a-like" substitutions may have occurred due to the inherent limitations of the voice recognition software  Read the chart carefully and recognize, using context, where substitutions may have occurred  If you have any questions, please contact the dictating provider for clarification or correction, as needed  This encounter has been coded by a non-certified coder         Martin Becerra MD    Date: 11/1/2018 Time: 3:33 PM

## 2018-11-01 NOTE — PROGRESS NOTES
Daily Note     Today's date: 2018  Patient name: Betsy Pedroza  : 1985  MRN: [id number]  Referring provider: Selam Vines PA-C  Dx:   Encounter Diagnosis     ICD-10-CM    1  Low back pain without sciatica, unspecified back pain laterality, unspecified chronicity M54 5                   Subjective: Patient comes to therapy reporting she felt relief of symptoms following the initial evaluation, however, notes she had an exacerbation of symptoms this past weekend of unknown etiology, denying changes in activity levels  Objective: See treatment diary below    Precautions: n/a    Daily Treatment Diary     Manual  10/23 11/1           LS sidelying roll mobs perf JOSEPH Gr IV-V            LS supine LF/rot mobs perf JOSEPH Gr IV-V                                                       Exercise Diary  10/23 11/1           bike  5 min                        LTR 15"x5 15"  x5 ea           DKTC 15"x5 15"x5           TrA isometric  nv                        Quad HTH str  3-way 15"x5 ea 15"  x3 ea           Cat/camel  10x ea                        Seated lumbar roll outs  15"x5 ea           Seated H/S str  30"   x3 ea                        TB sh  Flex   nv           TB sh  Ext   nv                                                                                             Modalities                                                           Assessment: Tolerated treatment well  Patient exhibited good technique with therapeutic exercises and would benefit from continued PT  Mild tightness reported in lumbar spine at end of session  Plan: Progress treatment as tolerated  Discussed possible D/C next visit to [facility]

## 2018-11-01 NOTE — PROGRESS NOTES
Assessment/Plan:   Axel Sutton is a 29 y  o female who is here for The encounter diagnosis was Skin mass  Patient with mass of buttock for 2 years after a fall  On exam found to have Lipoma of the left butock  Plan: Excise lesion(s) under anesthesia in the operating room        - Patient has been instructed to avoid aspirin containing medications or non-steroidal anti-inflammatory drugs for SEVEN days preceding surgery  Preoperative Clearance: None      US IMPRESSION:     Although imaging characteristics are not pathognomonic, its ultrasound appearance is typical of lipoma  Recommend clinical followup for size and symptom stability  Differential includes chronic encapsulated posttraumatic fat necrosis, or less likely   chronic hematoma or other nonspecific soft tissue lesions    _______________________________________________________  CC: Mass (L  Buttock mass)    HPI:  Axel Sutton is a 29 y  o female who was referred for evaluation of Mass (L  Buttock mass)    Currently patient reports lump in left buttock  Intermittent pain  Patient states she noticed it after a fall  Reports: growing    ROS:  General ROS: negative  negative for - chills, fatigue, fever or night sweats, weight loss  Respiratory ROS: no cough, shortness of breath, or wheezing  Cardiovascular ROS: no chest pain or dyspnea on exertion  Genito-Urinary ROS: no dysuria, trouble voiding, or hematuria  Musculoskeletal ROS: negative for - gait disturbance, joint pain or muscle pain  Neurological ROS: no TIA or stroke symptoms  Skin ROS: See HPI  GI ROS: see HPI  Skin ROS: no new rashes or lesions   Lymphatic ROS: no new adenopathy noted by pt     GYN ROS: see HPI, no new GYN history or bleeding noted  Psy ROS: no new mental or behavioral disturbances       Patient Active Problem List   Diagnosis    Back pain, chronic    Bipolar disorder, current episode manic without psychotic features ([facility] [address] )    Depression    Fatigue    Mild vitamin D deficiency    Left foot pain    Skin mass    Low back pain         Allergies:  Patient has no known allergies  Current Outpatient Prescriptions:     cyclobenzaprine (FLEXERIL) 10 mg tablet, Take 1 tablet (10 mg total) by mouth 3 (three) times a day as needed for muscle spasms (Patient not taking: Reported on 11/1/2018 ), Disp: 30 tablet, Rfl: 0    ergocalciferol (VITAMIN D2) 50,000 units, Take 1 capsule by mouth once a week, Disp: , Rfl:     FLUoxetine (PROzac) 20 mg capsule, Take 20 mg by mouth every morning, Disp: , Rfl: 0    LATUDA 20 MG tablet, Take 20 mg by mouth daily with breakfast, Disp: , Rfl: 0    naproxen (NAPROSYN) 500 mg tablet, Take 1 tablet (500 mg total) by mouth 2 (two) times a day with meals for 5 days, Disp: 60 tablet, Rfl: 1    Past Medical History:   Diagnosis Date    Anxiety     ASCUS with positive high risk HPV cervical 03/2008    Chicken pox     Depression     Disease of thyroid gland     hypothyroid    Habitual aborter not currently pregnant     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       Past Surgical History:   Procedure Laterality Date    COLPOSCOPY  01/2013    LASIK      Last Assessed - 09 May 2016    SALPINGECTOMY Bilateral 05/2015    Scope bs    TOOTH EXTRACTION      TUBAL LIGATION         Family History   Problem Relation Age of Onset    Anxiety disorder Mother     Dementia Maternal Grandmother         Alzheimer's    Colon cancer Maternal Grandmother     Hypertension Maternal Grandmother     Hypertension Paternal Grandmother     Diabetes Paternal Grandfather         reports that she has never smoked  She has never used smokeless tobacco  She reports that she drinks alcohol  She reports that she does not use drugs      PHYSICAL EXAM  General Appearance:    Alert, cooperative, no distress   Head:    Normocephalic without obvious abnormality   Eyes:    PERRL, conjunctiva/corneas clear, EOM's intact        Neck: Supple, no adenopathy, no JVD   Back:     Symmetric, no spinal or CVA tenderness   Lungs:     Clear to auscultation bilaterally, no wheezing or rhonchi   Heart:    Regular rate and rhythm, S1 and S2 normal, no murmur   Abdomen:     Benign, no rebound or guarding  Extremities:   Extremities normal  No clubbing, cyanosis or edema   Psych:   Normal Affect, AOx3  Neurologic:  Skin:   CNII-XII intact  Strength symmetric, speech intact    Warm, dry, intact, no visible rashes or lesions except as follows: soft tissue mass of left buttock             Physical Exam   Skin:          Some portions of this record may have been generated with voice recognition software  There may be translation, syntax,  or grammatical errors  Occasional wrong word or "sound-a-like" substitutions may have occurred due to the inherent limitations of the voice recognition software  Read the chart carefully and recognize, using context, where substitutions may have occurred  If you have any questions, please contact the dictating provider for clarification or correction, as needed  This encounter has been coded by a non-certified coder         Jesus Julian MD    Date: 11/1/2018 Time: 3:33 PM

## 2018-11-06 ENCOUNTER — OFFICE VISIT (OUTPATIENT)
Dept: PHYSICAL THERAPY | Facility: CLINIC | Age: 33
End: 2018-11-06
Payer: COMMERCIAL

## 2018-11-06 DIAGNOSIS — M54.50 LOW BACK PAIN WITHOUT SCIATICA, UNSPECIFIED BACK PAIN LATERALITY, UNSPECIFIED CHRONICITY: Primary | ICD-10-CM

## 2018-11-06 PROCEDURE — G8990 OTHER PT/OT CURRENT STATUS: HCPCS

## 2018-11-06 PROCEDURE — G8991 OTHER PT/OT GOAL STATUS: HCPCS

## 2018-11-06 PROCEDURE — 97140 MANUAL THERAPY 1/> REGIONS: CPT

## 2018-11-06 PROCEDURE — 97110 THERAPEUTIC EXERCISES: CPT

## 2018-11-06 NOTE — PROGRESS NOTES
Daily Note     Today's date: 2018  Patient name: Axel Sutton  : 1985  MRN: [id number]  Referring provider: Kali Brenner PA-C  Dx:   Encounter Diagnosis     ICD-10-CM    1  Low back pain without sciatica, unspecified back pain laterality, unspecified chronicity M54 5                   Subjective: Patient reports with c/o moderate pain upon waking in the a m  Graded 5-6/10  She noted pain diminishes as the day progresses and currently denied pain  Objective: See treatment diary below    Precautions: n/a    Daily Treatment Diary     Manual  10/23 11/1 11/6          LS sidelying roll mobs perf JOSEPH Gr IV-V  perf JOSEPH Gr IV-V             LS supine LF/rot mobs perf JOSEPH Gr IV-V                                                       Exercise Diary  10/23 11/1 11/6          bike  5 min 5 min                       LTR 15"x5 15"  x5 ea 15"  x5 ea          DKTC 15"x5 15"x5 15"x5          TrA isometric  nv 5"x10                       Quad HTH str  3-way 15"x5 ea 15"  x3 ea 15"  x5 ea          Cat/camel  10x ea 5"x10                       Seated lumbar roll outs  15"x5 ea 15"x5 ea          Seated H/S str  30"   x3 ea 30"   x3 ea                       TB sh  Flex   nv nv          TB sh  Ext   nv nv                                                                                            Modalities                                                           Assessment: Tolerated treatment well  Added exercises as listed with good response; denied pain while doing so  Patient exhibited good technique with therapeutic exercises and would benefit from continued PT  Plan: Progress treatment as tolerated  Discussed possible D/C to HEP, but patient unsure to continue with PT, therefore will continue 1x/wk for another 2 weeks

## 2018-11-07 PROBLEM — R22.9 MASS OF SKIN: Status: ACTIVE | Noted: 2018-11-07

## 2018-11-15 ENCOUNTER — APPOINTMENT (OUTPATIENT)
Dept: PHYSICAL THERAPY | Facility: CLINIC | Age: 33
End: 2018-11-15
Payer: COMMERCIAL

## 2018-11-20 ENCOUNTER — APPOINTMENT (OUTPATIENT)
Dept: PHYSICAL THERAPY | Facility: CLINIC | Age: 33
End: 2018-11-20
Payer: COMMERCIAL

## 2019-01-15 ENCOUNTER — HOSPITAL ENCOUNTER (EMERGENCY)
Facility: HOSPITAL | Age: 34
Discharge: HOME | End: 2019-01-15
Attending: EMERGENCY MEDICINE
Payer: COMMERCIAL

## 2019-01-15 ENCOUNTER — APPOINTMENT (EMERGENCY)
Dept: RADIOLOGY | Facility: HOSPITAL | Age: 34
End: 2019-01-15
Payer: COMMERCIAL

## 2019-01-15 VITALS
SYSTOLIC BLOOD PRESSURE: 151 MMHG | TEMPERATURE: 97.7 F | OXYGEN SATURATION: 100 % | DIASTOLIC BLOOD PRESSURE: 70 MMHG | BODY MASS INDEX: 34.57 KG/M2 | WEIGHT: 177 LBS | HEART RATE: 90 BPM | RESPIRATION RATE: 18 BRPM

## 2019-01-15 DIAGNOSIS — M54.50 LOW BACK PAIN: Primary | ICD-10-CM

## 2019-01-15 LAB
BACTERIA UR QL AUTO: ABNORMAL /HPF
BILIRUB UR QL STRIP: NEGATIVE
CLARITY UR: CLEAR
COLOR UR: YELLOW
EXT PREG TEST URINE: NEGATIVE
GLUCOSE UR STRIP-MCNC: NEGATIVE MG/DL
HGB UR QL STRIP.AUTO: ABNORMAL
KETONES UR STRIP-MCNC: NEGATIVE MG/DL
LEUKOCYTE ESTERASE UR QL STRIP: ABNORMAL
NITRITE UR QL STRIP: NEGATIVE
NON-SQ EPI CELLS URNS QL MICRO: ABNORMAL /HPF
PH UR STRIP.AUTO: 5.5 [PH] (ref 4.5–8)
PROT UR STRIP-MCNC: NEGATIVE MG/DL
RBC #/AREA URNS AUTO: ABNORMAL /HPF
SP GR UR STRIP.AUTO: 1.02 (ref 1–1.03)
UROBILINOGEN UR QL STRIP.AUTO: 0.2 E.U./DL
WBC #/AREA URNS AUTO: ABNORMAL /HPF

## 2019-01-15 PROCEDURE — 81025 URINE PREGNANCY TEST: CPT | Performed by: PHYSICIAN ASSISTANT

## 2019-01-15 PROCEDURE — 99284 EMERGENCY DEPT VISIT MOD MDM: CPT

## 2019-01-15 PROCEDURE — 81001 URINALYSIS AUTO W/SCOPE: CPT

## 2019-01-15 PROCEDURE — 72100 X-RAY EXAM L-S SPINE 2/3 VWS: CPT

## 2019-01-15 PROCEDURE — 87086 URINE CULTURE/COLONY COUNT: CPT

## 2019-01-15 RX ORDER — LIDOCAINE 50 MG/G
1 PATCH TOPICAL ONCE
Status: DISCONTINUED | OUTPATIENT
Start: 2019-01-15 | End: 2019-01-15 | Stop reason: HOSPADM

## 2019-01-15 RX ORDER — LIDOCAINE 50 MG/G
1 PATCH TOPICAL DAILY
Qty: 10 PATCH | Refills: 0 | Status: SHIPPED | OUTPATIENT
Start: 2019-01-15 | End: 2019-03-05

## 2019-01-15 RX ORDER — NAPROXEN 250 MG/1
250 TABLET ORAL ONCE
Status: COMPLETED | OUTPATIENT
Start: 2019-01-15 | End: 2019-01-15

## 2019-01-15 RX ORDER — NAPROXEN 500 MG/1
500 TABLET ORAL 2 TIMES DAILY WITH MEALS
Qty: 10 TABLET | Refills: 0 | Status: SHIPPED | OUTPATIENT
Start: 2019-01-15 | End: 2019-03-09 | Stop reason: ALTCHOICE

## 2019-01-15 RX ADMIN — LIDOCAINE 1 PATCH: 50 PATCH TOPICAL at 20:24

## 2019-01-15 RX ADMIN — NAPROXEN 250 MG: 250 TABLET ORAL at 20:24

## 2019-01-16 LAB — BACTERIA UR CULT: NORMAL

## 2019-01-16 NOTE — ED PROVIDER NOTES
History  Chief Complaint   Patient presents with    Back Pain     Patient presents complaining of lower midline back pain, "over spine", no radiation  Denies recent injury/trauma  No urinary symptoms  25-year-old female past medical history of chronic back pain presents today complaining of low back pain that started yesterday  She denies any preceding injury  Pain is in same spot that she usually has pain however it is "much worse this time"  She tried taking motrin early this morning without relief  Denies radiation of pain, urinary symptoms, bowel or bladder incontinence, numbness or weakness  Denies fevers, chills, chest pain, abdominal pain, nausea, vomiting, diarrhea  Prior to Admission Medications   Prescriptions Last Dose Informant Patient Reported? Taking?    FLUoxetine (PROzac) 20 mg capsule  Self Yes No   Sig: Take 20 mg by mouth every morning   LATUDA 20 MG tablet  Self Yes No   Sig: Take 20 mg by mouth daily with breakfast   cyclobenzaprine (FLEXERIL) 10 mg tablet  Self No No   Sig: Take 1 tablet (10 mg total) by mouth 3 (three) times a day as needed for muscle spasms   Patient not taking: Reported on 11/1/2018    ergocalciferol (VITAMIN D2) 50,000 units  Self Yes No   Sig: Take 1 capsule by mouth once a week   naproxen (NAPROSYN) 500 mg tablet   No No   Sig: Take 1 tablet (500 mg total) by mouth 2 (two) times a day with meals for 5 days      Facility-Administered Medications: None       Past Medical History:   Diagnosis Date    Anxiety     ASCUS with positive high risk HPV cervical 03/2008    Chicken pox     Depression     Disease of thyroid gland     hypothyroid    Habitual aborter not currently pregnant     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       Past Surgical History:   Procedure Laterality Date    COLPOSCOPY  01/2013    LASIK      Last Assessed - 09 May 2016    SALPINGECTOMY Bilateral 05/2015    Scope bs    TOOTH EXTRACTION      TUBAL LIGATION Family History   Problem Relation Age of Onset    Anxiety disorder Mother     Dementia Maternal Grandmother         Alzheimer's    Colon cancer Maternal Grandmother     Hypertension Maternal Grandmother     Hypertension Paternal Grandmother     Diabetes Paternal Grandfather      I have reviewed and agree with the history as documented  Social History   Substance Use Topics    Smoking status: Never Smoker    Smokeless tobacco: Never Used    Alcohol use Yes      Comment: occ        Review of Systems   Musculoskeletal: Positive for back pain  All other systems reviewed and are negative  Physical Exam  Physical Exam   Constitutional: She is oriented to person, place, and time  She appears well-developed and well-nourished  No distress  HENT:   Head: Normocephalic and atraumatic  Eyes: Conjunctivae are normal    Neck: Normal range of motion  Cardiovascular: Normal rate, regular rhythm and normal heart sounds  Pulmonary/Chest: Effort normal and breath sounds normal  No respiratory distress  She has no wheezes  She has no rales  Abdominal: Soft  Bowel sounds are normal  She exhibits no distension and no mass  There is no tenderness  There is no guarding  Musculoskeletal:        Lumbar back: She exhibits decreased range of motion, tenderness and bony tenderness  She exhibits no swelling, no edema, no deformity, no laceration and no pain  Back:    Neurological: She is alert and oriented to person, place, and time  She displays normal reflexes  No cranial nerve deficit  Coordination normal    Skin: Skin is warm and dry  Capillary refill takes less than 2 seconds  No rash noted  She is not diaphoretic  No erythema  Psychiatric: She has a normal mood and affect         Vital Signs  ED Triage Vitals [01/15/19 1750]   Temperature Pulse Respirations Blood Pressure SpO2   97 7 °F (36 5 °C) 90 18 151/70 100 %      Temp Source Heart Rate Source Patient Position - Orthostatic VS BP Location FiO2 (%)   Temporal Monitor Sitting Right arm --      Pain Score       7           Vitals:    01/15/19 1750   BP: 151/70   Pulse: 90   Patient Position - Orthostatic VS: Sitting       Visual Acuity      ED Medications  Medications   naproxen (NAPROSYN) tablet 250 mg (250 mg Oral Given 1/15/19 2024)       Diagnostic Studies  Results Reviewed     Procedure Component Value Units Date/Time    Urine culture [[id number]] Collected:  01/15/19 1951    Lab Status:  Final result Specimen:  Urine from Urine, Clean Catch Updated:  01/16/19 2029     Urine Culture [id number] cfu/ml     Urine Microscopic [[id number]]  (Abnormal) Collected:  01/15/19 1951    Lab Status:  Final result Specimen:  Urine from Urine, Clean Catch Updated:  01/15/19 2015     RBC, UA None Seen /hpf      WBC, UA 10-20 (A) /hpf      Epithelial Cells Occasional /hpf      Bacteria, UA Occasional /hpf     POCT pregnancy, urine [[id number]]  (Normal) Resulted:  01/15/19 1935    Lab Status:  Final result Updated:  01/15/19 1935     EXT PREG TEST UR (Ref: Negative) negative    POCT urinalysis dipstick [[id number]]  (Normal) Resulted:  01/15/19 1934    Lab Status:  Final result Updated:  01/15/19 1935     Clarity, UA --    ED Urine Macroscopic [[id number]]  (Abnormal) Collected:  01/15/19 1951    Lab Status:  Final result Specimen:  Urine Updated:  01/15/19 1934     Color, UA Yellow     Clarity, UA Clear     pH, UA 5 5     Leukocytes, UA Moderate (A)     Nitrite, UA Negative     Protein, UA Negative mg/dl      Glucose, UA Negative mg/dl      Ketones, UA Negative mg/dl      Urobilinogen, UA 0 2 E U /dl      Bilirubin, UA Negative     Blood, UA Trace (A)     Specific [place], UA 1 020    Narrative:       CLINITEK RESULT                 XR spine lumbar 2 or 3 views injury   ED Interpretation by Zhanna Nur PA-C (01/15 1944)   NAD      Final Result by Akin Ferguson DO (01/16 0900)   Normal examination           Workstation performed: [id number] Procedures  Procedures       Phone Contacts  ED Phone Contact    ED Course                               MDM  CritCare Time    Disposition  Final diagnoses:   Low back pain     Time reflects when diagnosis was documented in both MDM as applicable and the Disposition within this note     Time User Action Codes Description Comment    1/15/2019  8:20 PM Yudith Ng Marleny [M54 5] Low back pain       ED Disposition     ED Disposition Condition Date/Time Comment    Discharge  Tue Gaudencio 15, 2019  8:21 PM [facility] discharge to home/self care      Condition at discharge: Good        Follow-up Information     Follow up With Specialties Details Why Contact Info    Irene Joshi PA-C Family Medicine, Physician Assistant Schedule an appointment as soon as possible for a visit  [address] [facility]  [facility] [address] [place] [address]  [phone]            Discharge Medication List as of 1/15/2019  8:21 PM      START taking these medications    Details   lidocaine (LIDODERM) 5 % Apply 1 patch topically daily Remove & Discard patch within 12 hours or as directed by MD, Starting Tue 1/15/2019, Print         CONTINUE these medications which have CHANGED    Details   naproxen (NAPROSYN) 500 mg tablet Take 1 tablet (500 mg total) by mouth 2 (two) times a day with meals for 5 days, Starting Tue 1/15/2019, Until Sun 1/20/2019, Print         CONTINUE these medications which have NOT CHANGED    Details   cyclobenzaprine (FLEXERIL) 10 mg tablet Take 1 tablet (10 mg total) by mouth 3 (three) times a day as needed for muscle spasms, Starting Tue 10/16/2018, Normal      ergocalciferol (VITAMIN D2) 50,000 units Take 1 capsule by mouth once a week, Starting Sat 2/6/2016, Historical Med      FLUoxetine (PROzac) 20 mg capsule Take 20 mg by mouth every morning, Starting Wed 9/12/2018, Historical Med      LATUDA 20 MG tablet Take 20 mg by mouth daily with breakfast, Starting Wed 9/12/2018, Historical Med           No discharge procedures on file      ED Provider  Electronically Signed by           Gerardo Brandon PA-C  01/31/19 8002

## 2019-01-16 NOTE — DISCHARGE INSTRUCTIONS
Acute Low Back Pain   WHAT YOU NEED TO KNOW:   Acute low back pain is sudden discomfort in your lower back area that lasts for up to 6 weeks  The discomfort makes it difficult to tolerate activity  DISCHARGE INSTRUCTIONS:   Return to the emergency department if:   · You have severe pain  · You have sudden stiffness and heaviness on both buttocks down to both legs  · You have numbness or weakness in one leg, or pain in both legs  · You have numbness in your genital area or across your lower back  · You cannot control your urine or bowel movements  Contact your healthcare provider if:   · You have a fever  · You have pain at night or when you rest     · Your pain does not get better with treatment  · You have pain that worsens when you cough or sneeze  · You suddenly feel something pop or snap in your back  · You have questions or concerns about your condition or care  Medicines: The following medicines may be ordered by your healthcare provider:  · Acetaminophen  decreases pain  It is available without a doctor's order  Ask how much to take and how often to take it  Follow directions  Acetaminophen can cause liver damage if not taken correctly  · NSAIDs  help decrease swelling and pain  This medicine is available with or without a doctor's order  NSAIDs can cause stomach bleeding or kidney problems in certain people  If you take blood thinner medicine, always ask your healthcare provider if NSAIDs are safe for you  Always read the medicine label and follow directions  · Prescription pain medicine  may be given  Ask your healthcare provider how to take this medicine safely  · Muscle relaxers  decrease pain by relaxing the muscles in your lower spine  · Take your medicine as directed  Contact your healthcare provider if you think your medicine is not helping or if you have side effects  Tell him of her if you are allergic to any medicine   Keep a list of the medicines, vitamins, and herbs you take  Include the amounts, and when and why you take them  Bring the list or the pill bottles to follow-up visits  Carry your medicine list with you in case of an emergency  Self-care:   · Stay active  as much as you can without causing more pain  Bed rest could make your back pain worse  Start with some light exercises such as walking  Avoid heavy lifting until your pain is gone  Ask for more information about the activities or exercises that are right for you  · Ice  helps decrease swelling, pain, and muscle spams  Put crushed ice in a plastic bag  Cover it with a towel  Place it on your lower back for 20 to 30 minutes every 2 hours  Do this for about 2 to 3 days after your pain starts, or as directed  · Heat  helps decrease pain and muscle spasms  Start to use heat after treatment with ice has stopped  Use a small towel dampened with warm water or a heating pad, or sit in a warm bath  Apply heat on the area for 20 to 30 minutes every 2 hours for as many days as directed  Alternate heat and ice  Prevent acute low back pain:   · Use proper body mechanics  ¨ Bend at the hips and knees when you  objects  Do not bend from the waist  Use your leg muscles as you lift the load  Do not use your back  Keep the object close to your chest as you lift it  Try not to twist or lift anything above your waist     ¨ Change your position often when you stand for long periods of time  Rest one foot on a small box or footrest, and then switch to the other foot often  ¨ Try not to sit for long periods of time  When you do, sit in a straight-backed chair with your feet flat on the floor  Never reach, pull, or push while you are sitting  · Do exercises that strengthen your back muscles  Warm up before you exercise  Ask your healthcare provider the best exercises for you  · Maintain a healthy weight  Ask your healthcare provider how much you should weigh   Ask him to help you create a weight loss plan if you are overweight  Follow up with your healthcare provider as directed:  Return for a follow-up visit if you still have pain after 1 to 3 weeks of treatment  You may need to visit an orthopedist if your back pain lasts more than 12 weeks  Write down your questions so you remember to ask them during your visits  © 2017 [employer]  Information is for End User's use only and may not be sold, redistributed or otherwise used for commercial purposes  All illustrations and images included in CareNotes® are the copyrighted property of [employer]  or [employer]  The above information is an  only  It is not intended as medical advice for individual conditions or treatments  Talk to your doctor, nurse or pharmacist before following any medical regimen to see if it is safe and effective for you

## 2019-01-17 ENCOUNTER — OFFICE VISIT (OUTPATIENT)
Dept: FAMILY MEDICINE CLINIC | Facility: CLINIC | Age: 34
End: 2019-01-17

## 2019-01-17 VITALS
RESPIRATION RATE: 18 BRPM | HEART RATE: 99 BPM | SYSTOLIC BLOOD PRESSURE: 110 MMHG | WEIGHT: 178 LBS | HEIGHT: 60 IN | DIASTOLIC BLOOD PRESSURE: 60 MMHG | BODY MASS INDEX: 34.95 KG/M2 | OXYGEN SATURATION: 99 % | TEMPERATURE: 97.7 F

## 2019-01-17 DIAGNOSIS — G89.29 CHRONIC MIDLINE LOW BACK PAIN WITHOUT SCIATICA: Primary | ICD-10-CM

## 2019-01-17 DIAGNOSIS — M54.50 CHRONIC MIDLINE LOW BACK PAIN WITHOUT SCIATICA: Primary | ICD-10-CM

## 2019-01-17 PROCEDURE — 99051 MED SERV EVE/WKEND/HOLIDAY: CPT | Performed by: PHYSICIAN ASSISTANT

## 2019-01-17 PROCEDURE — 3008F BODY MASS INDEX DOCD: CPT | Performed by: PHYSICIAN ASSISTANT

## 2019-01-17 PROCEDURE — 99214 OFFICE O/P EST MOD 30 MIN: CPT | Performed by: PHYSICIAN ASSISTANT

## 2019-01-17 NOTE — PATIENT INSTRUCTIONS
Apply heat 3 times daily for 10 minutes as needed  Proceed with an MRI scan of the lumbar spine  Refer to pain management  Follow-up if any symptoms increase in the meantime

## 2019-01-17 NOTE — PROGRESS NOTES
Assessment/Plan:    ER note from January 15th does not appear to be completed  Physical therapy notes have been reviewed  My prior notes have been reviewed  Patient Instructions   Apply heat 3 times daily for 10 minutes as needed  Proceed with an MRI scan of the lumbar spine  Refer to pain management  Follow-up if any symptoms increase in the meantime    M*Modal software was used to dictate this note  It may contain errors with dictating incorrect words/spelling  Please contact provider directly for any questions  Diagnoses and all orders for this visit:    Chronic midline low back pain without sciatica  -     MRI lumbar spine wo contrast; Future  -     Ambulatory referral to Pain Management; Future          Subjective:      Patient ID: Jeremy Pedraza is a 29 y o  female  Patient presents today for continued mid low back pain  She states 2 days ago she had increasing pain again  She presented to the emergency room because she had a hard time standing up straight  She denies any numbness, tingling, radiation of pain down her lower extremities  She states her legs feel weak today  She denies any loss of bladder or bowel control or saddle paresthesias  She was taking naproxen but is causing some GI upset so she discontinued the medication  She could not tolerate the side effects from the muscle relaxers  She did go to physical therapy and only had 2 more sessions left so stopped going and continued on a home exercise program   She states overall the therapy was helping her until 2 days ago  She denies any new injuries  The following portions of the patient's history were reviewed and updated as appropriate:   She  has a past medical history of Anxiety; ASCUS with positive high risk HPV cervical (03/2008); Chicken pox; Depression; Disease of thyroid gland; Habitual aborter not currently pregnant; and LGSIL of cervix of undetermined significance (12/2012)    She   Patient Active Problem List Diagnosis Date Noted    Mass of skin 11/07/2018    Skin mass 10/16/2018    Low back pain 10/16/2018    Left foot pain 07/24/2018    Bipolar disorder, current episode manic without psychotic features [facility] 11/20/2016    Mild vitamin D deficiency 02/06/2016    Depression 02/03/2016    Fatigue 02/03/2016    Back pain, chronic 01/06/2015     She  has a past surgical history that includes Tubal ligation; Colposcopy (01/2013); LASIK; Salpingectomy (Bilateral, 05/2015); and Tooth extraction  Her family history includes Anxiety disorder in her mother; Colon cancer in her maternal grandmother; Dementia in her maternal grandmother; Diabetes in her paternal grandfather; Hypertension in her maternal grandmother and paternal grandmother  She  reports that she has never smoked  She has never used smokeless tobacco  She reports that she drinks alcohol  She reports that she does not use drugs  Current Outpatient Prescriptions   Medication Sig Dispense Refill    cyclobenzaprine (FLEXERIL) 10 mg tablet Take 1 tablet (10 mg total) by mouth 3 (three) times a day as needed for muscle spasms (Patient not taking: Reported on 11/1/2018 ) 30 tablet 0    ergocalciferol (VITAMIN D2) 50,000 units Take 1 capsule by mouth once a week      FLUoxetine (PROzac) 20 mg capsule Take 20 mg by mouth every morning  0    LATUDA 20 MG tablet Take 20 mg by mouth daily with breakfast  0    lidocaine (LIDODERM) 5 % Apply 1 patch topically daily Remove & Discard patch within 12 hours or as directed by MD 10 patch 0    naproxen (NAPROSYN) 500 mg tablet Take 1 tablet (500 mg total) by mouth 2 (two) times a day with meals for 5 days 60 tablet 1    naproxen (NAPROSYN) 500 mg tablet Take 1 tablet (500 mg total) by mouth 2 (two) times a day with meals for 5 days 10 tablet 0     No current facility-administered medications for this visit        Current Outpatient Prescriptions on File Prior to Visit   Medication Sig    cyclobenzaprine (FLEXERIL) 10 mg tablet Take 1 tablet (10 mg total) by mouth 3 (three) times a day as needed for muscle spasms (Patient not taking: Reported on 11/1/2018 )    ergocalciferol (VITAMIN D2) 50,000 units Take 1 capsule by mouth once a week    FLUoxetine (PROzac) 20 mg capsule Take 20 mg by mouth every morning    LATUDA 20 MG tablet Take 20 mg by mouth daily with breakfast    lidocaine (LIDODERM) 5 % Apply 1 patch topically daily Remove & Discard patch within 12 hours or as directed by MD    naproxen (NAPROSYN) 500 mg tablet Take 1 tablet (500 mg total) by mouth 2 (two) times a day with meals for 5 days    naproxen (NAPROSYN) 500 mg tablet Take 1 tablet (500 mg total) by mouth 2 (two) times a day with meals for 5 days     No current facility-administered medications on file prior to visit  She has No Known Allergies       Review of Systems   Gastrointestinal:        As stated in HPI   Genitourinary:        As stated in HPI   Musculoskeletal:        As stated in HPI   Neurological:        As stated in HPI         Objective:      /60 (BP Location: Right arm, Patient Position: Sitting, Cuff Size: Large)   Pulse 99   Temp 97 7 °F (36 5 °C) (Tympanic)   Resp 18   Ht 5' (1 524 m)   Wt 80 7 kg (178 lb)   LMP 12/20/2018 (Approximate)   SpO2 99%   Breastfeeding? No   BMI 34 76 kg/m²          Physical Exam   Constitutional: She appears well-developed and well-nourished  No distress  HENT:   Head: Normocephalic and atraumatic  Musculoskeletal:   Gait:  Normal    Lumbar spine:  She does have some tenderness over the mid lumbar spine  Limited forward flexion because of pain  Strength of the lower extremities including EHL's is 5+/5 and equal bilaterally  Reflexes are 2+ at the knees and 0 at the ankles    Negative straight leg raise bilaterally

## 2019-03-05 ENCOUNTER — CONSULT (OUTPATIENT)
Dept: PAIN MEDICINE | Facility: MEDICAL CENTER | Age: 34
End: 2019-03-05
Payer: COMMERCIAL

## 2019-03-05 VITALS
BODY MASS INDEX: 34.83 KG/M2 | SYSTOLIC BLOOD PRESSURE: 115 MMHG | HEART RATE: 98 BPM | WEIGHT: 177.4 LBS | HEIGHT: 60 IN | RESPIRATION RATE: 12 BRPM | DIASTOLIC BLOOD PRESSURE: 78 MMHG

## 2019-03-05 DIAGNOSIS — M54.50 CHRONIC MIDLINE LOW BACK PAIN WITHOUT SCIATICA: ICD-10-CM

## 2019-03-05 DIAGNOSIS — G89.29 CHRONIC MIDLINE LOW BACK PAIN WITHOUT SCIATICA: ICD-10-CM

## 2019-03-05 DIAGNOSIS — S39.012A STRAIN OF LUMBAR REGION, INITIAL ENCOUNTER: ICD-10-CM

## 2019-03-05 DIAGNOSIS — M79.18 MYOFASCIAL PAIN SYNDROME: Primary | ICD-10-CM

## 2019-03-05 PROCEDURE — 99244 OFF/OP CNSLTJ NEW/EST MOD 40: CPT | Performed by: PHYSICAL MEDICINE & REHABILITATION

## 2019-03-05 RX ORDER — TIZANIDINE 4 MG/1
4 TABLET ORAL 2 TIMES DAILY
Qty: 60 TABLET | Refills: 0 | Status: SHIPPED | OUTPATIENT
Start: 2019-03-05 | End: 2019-03-09 | Stop reason: ALTCHOICE

## 2019-03-05 RX ORDER — IBUPROFEN 200 MG
TABLET ORAL EVERY 6 HOURS PRN
COMMUNITY
End: 2019-03-09 | Stop reason: ALTCHOICE

## 2019-03-05 NOTE — PROGRESS NOTES
Assessment:  1  Myofascial pain syndrome    2  Chronic midline low back pain without sciatica    3  Strain of lumbar region, initial encounter        Plan:  1  Initiate tizanidine  2  Continue with physical therapy home exercise program and stretching regimen  3  Follow-up in 4 weeks with nurse practitioner    My impressions and treatment recommendations were discussed in detail with the patient who verbalized understanding and had no further questions  Discharge instructions were provided  I personally saw and examined the patient and I agree with the above discussed plan of care  No orders of the defined types were placed in this encounter  New Medications Ordered This Visit   Medications    ibuprofen (MOTRIN) 200 mg tablet     Sig: Take by mouth every 6 (six) hours as needed for mild pain       History of Present Illness:    Jamel Godinez is a 29 y o  female sent for consultation regarding lumbar paraspinal pain  This has been problematic for number years for her without any inciting event or trauma  She describes moderate to severe intensity lower back pain rated as a 7/10 which is intermittent in worse in the morning and evening  This is characterized as sharp, dull, aching, and pressure like  She localizes this to the lumbar spine without radiation down the legs  She notes worse pain with forward flexion  Lumbar extension does not reproduce significant pain  Aggravating factors include lying down, bending, sitting, coughing, sneezing, and menstruation  Alleviating factors include standing and relaxation  She did go to the emergency room and had x-rays obtained which were unremarkable for any concerning pathology  She has noted some moderate relief of local heat or ice application and no relief with physical therapy or exercise  Currently using ibuprofen and states that this does seem to give some relief      I have personally reviewed and/or updated the patient's past medical history, past surgical history, family history, social history, current medications, allergies, and vital signs today  Review of Systems:    Review of Systems   Constitutional: Positive for unexpected weight change  Negative for fever  HENT: Negative for trouble swallowing  Eyes: Negative for visual disturbance  Respiratory: Negative for shortness of breath and wheezing  Cardiovascular: Negative for chest pain and palpitations  Gastrointestinal: Negative for constipation, diarrhea, nausea and vomiting  Endocrine: Negative for cold intolerance, heat intolerance and polydipsia  Genitourinary: Negative for difficulty urinating and frequency  Musculoskeletal: Positive for joint swelling and myalgias  Negative for arthralgias and gait problem  Skin: Negative for rash  Neurological: Negative for dizziness, seizures, syncope, weakness and headaches  Hematological: Does not bruise/bleed easily  Psychiatric/Behavioral: Negative for dysphoric mood  All other systems reviewed and are negative        Patient Active Problem List   Diagnosis    Back pain, chronic    Bipolar disorder, current episode manic without psychotic features (HCC)    Depression    Fatigue    Mild vitamin D deficiency    Left foot pain    Skin mass    Low back pain    Mass of skin       Past Medical History:   Diagnosis Date    Anxiety     ASCUS with positive high risk HPV cervical 03/2008    Chicken pox     Depression     Disease of thyroid gland     hypothyroid    Habitual aborter not currently pregnant     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       Past Surgical History:   Procedure Laterality Date    COLPOSCOPY  01/2013    LASIK      Last Assessed - 09 May 2016    SALPINGECTOMY Bilateral 05/2015    Scope bs    TOOTH EXTRACTION      TUBAL LIGATION         Family History   Problem Relation Age of Onset    Anxiety disorder Mother     Dementia Maternal Grandmother         Alzheimer's    Colon cancer Maternal Grandmother     Hypertension Maternal Grandmother     Hypertension Paternal Grandmother     Diabetes Paternal Grandfather        Social History     Occupational History    Occupation: line leader production  Tobacco Use    Smoking status: Never Smoker    Smokeless tobacco: Never Used   Substance and Sexual Activity    Alcohol use: Yes     Alcohol/week: 1 8 oz     Types: 3 Shots of liquor per week     Comment: occ    Drug use: No    Sexual activity: Not on file     Comment: Per [employer] - risk for STD partner with multiple partners       Current Outpatient Medications on File Prior to Visit   Medication Sig    ibuprofen (MOTRIN) 200 mg tablet Take by mouth every 6 (six) hours as needed for mild pain    naproxen (NAPROSYN) 500 mg tablet Take 1 tablet (500 mg total) by mouth 2 (two) times a day with meals for 5 days    naproxen (NAPROSYN) 500 mg tablet Take 1 tablet (500 mg total) by mouth 2 (two) times a day with meals for 5 days    [DISCONTINUED] cyclobenzaprine (FLEXERIL) 10 mg tablet Take 1 tablet (10 mg total) by mouth 3 (three) times a day as needed for muscle spasms (Patient not taking: Reported on 11/1/2018 )    [DISCONTINUED] ergocalciferol (VITAMIN D2) 50,000 units Take 1 capsule by mouth once a week    [DISCONTINUED] FLUoxetine (PROzac) 20 mg capsule Take 20 mg by mouth every morning    [DISCONTINUED] LATUDA 20 MG tablet Take 20 mg by mouth daily with breakfast    [DISCONTINUED] lidocaine (LIDODERM) 5 % Apply 1 patch topically daily Remove & Discard patch within 12 hours or as directed by MD     No current facility-administered medications on file prior to visit  No Known Allergies    Physical Exam:    /78   Pulse 98   Resp 12   Ht 5' (1 524 m)   Wt 80 5 kg (177 lb 6 4 oz)   BMI 34 65 kg/m²     LUMBAR  General: Well-developed, well-nourished individual in no acute distress  Mental: Appropriate mood and affect   Grossly oriented with coherent speech and thought processing   Neuro:  Cranial nerves: Cranial nerve function is grossly intact bilaterally   Strength: Bilateral lower extremity strength is normal and symmetric   No atrophy or tone abnormalities noted   Reflexes: Bilateral lower extremity muscle stretch reflexes are physiologic and symmetric   No ankle clonus is noted   Sensation: No loss of sensation is noted   SLR/Foraminal Compression Maneuvers: Straight leg raising in the sitting position is negative for radicular pain   Gait:  Gait/gross motor: Gait is normal  Station is normal  Toe walking, heel walking  are normal     Musculoskeletal:  Palpation: Inspection and palpation of the spine and extremities are unremarkable except for tenderness to palpation over the lumbar paraspinal musculature and centrally over the spinous processes reproducing her pain  Spine: Normal pain-free range of motion except for end range forward flexion which reproduces her back pain complaint she describes a tight sensation as well  No gross axial skeletal deformities   Skin: Skin inspection grossly negative for erythema, breakdown, or concerning lesions in affected area   Lymph: No lymphadenopathy is appreciated in the involved extremity   Vessels: No lower extremity edema   Lungs: Breathing is comfortable and regular  No dyspnea noted during examination   Eyes: Visual field grossly intact to confrontation  No redness appreciated  ENT: No craniofacial deformities or asymmetry  No neck masses appreciated           ImagingStudy Result     LUMBAR SPINE     INDICATION:   low back pain      COMPARISON:  None     VIEWS:  XR SPINE LUMBAR 2 OR 3 VIEWS INJURY  3     FINDINGS: Bone mineralization within normal limits      There is no evidence of acute fracture or destructive osseous lesion      Alignment is unremarkable      No significant lumbar degenerative change noted      The pedicles appear intact      Soft tissues are unremarkable         IMPRESSION:  Normal examination         Workstation performed: [id number]      Imaging     XR spine lumbar 2 or 3 views injury (Order: [id number]) - 1/15/2019

## 2019-03-05 NOTE — LETTER
March 5, 2019     Juan Carlos Alba PA-C  [address] [facility]    Patient: Joshua Brandon   YOB: 1985   Date of Visit: 3/5/2019       Dear Dr Roby Mijares: Thank you for referring Joshua Brandon to me for evaluation  Below are my notes for this consultation  If you have questions, please do not hesitate to call me  I look forward to following your patient along with you  Sincerely,        Darrell Pereira DO        CC: No Recipients  Darrell Pereira DO  3/5/2019  4:01 PM  Sign at close encounter  Assessment:  1  Myofascial pain syndrome    2  Chronic midline low back pain without sciatica    3  Strain of lumbar region, initial encounter        Plan:  1  Initiate tizanidine  2  Continue with physical therapy home exercise program and stretching regimen  3  Follow-up in 4 weeks with nurse practitioner    My impressions and treatment recommendations were discussed in detail with the patient who verbalized understanding and had no further questions  Discharge instructions were provided  I personally saw and examined the patient and I agree with the above discussed plan of care  No orders of the defined types were placed in this encounter  New Medications Ordered This Visit   Medications    ibuprofen (MOTRIN) 200 mg tablet     Sig: Take by mouth every 6 (six) hours as needed for mild pain       History of Present Illness:    Joshua Brandon is a 29 y o  female sent for consultation regarding lumbar paraspinal pain  This has been problematic for number years for her without any inciting event or trauma  She describes moderate to severe intensity lower back pain rated as a 7/10 which is intermittent in worse in the morning and evening  This is characterized as sharp, dull, aching, and pressure like  She localizes this to the lumbar spine without radiation down the legs  She notes worse pain with forward flexion    Lumbar extension does not reproduce significant pain     Aggravating factors include lying down, bending, sitting, coughing, sneezing, and menstruation  Alleviating factors include standing and relaxation  She did go to the emergency room and had x-rays obtained which were unremarkable for any concerning pathology  She has noted some moderate relief of local heat or ice application and no relief with physical therapy or exercise  Currently using ibuprofen and states that this does seem to give some relief  I have personally reviewed and/or updated the patient's past medical history, past surgical history, family history, social history, current medications, allergies, and vital signs today  Review of Systems:    Review of Systems   Constitutional: Positive for unexpected weight change  Negative for fever  HENT: Negative for trouble swallowing  Eyes: Negative for visual disturbance  Respiratory: Negative for shortness of breath and wheezing  Cardiovascular: Negative for chest pain and palpitations  Gastrointestinal: Negative for constipation, diarrhea, nausea and vomiting  Endocrine: Negative for cold intolerance, heat intolerance and polydipsia  Genitourinary: Negative for difficulty urinating and frequency  Musculoskeletal: Positive for joint swelling and myalgias  Negative for arthralgias and gait problem  Skin: Negative for rash  Neurological: Negative for dizziness, seizures, syncope, weakness and headaches  Hematological: Does not bruise/bleed easily  Psychiatric/Behavioral: Negative for dysphoric mood  All other systems reviewed and are negative        Patient Active Problem List   Diagnosis    Back pain, chronic    Bipolar disorder, current episode manic without psychotic features (HCC)    Depression    Fatigue    Mild vitamin D deficiency    Left foot pain    Skin mass    Low back pain    Mass of skin       Past Medical History:   Diagnosis Date    Anxiety     ASCUS with positive high risk HPV cervical 03/2008    Chicken pox     Depression     Disease of thyroid gland     hypothyroid    Habitual aborter not currently pregnant     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       Past Surgical History:   Procedure Laterality Date    COLPOSCOPY  01/2013    LASIK      Last Assessed - 09 May 2016    SALPINGECTOMY Bilateral 05/2015    Scope bs    TOOTH EXTRACTION      TUBAL LIGATION         Family History   Problem Relation Age of Onset    Anxiety disorder Mother     Dementia Maternal Grandmother         Alzheimer's    Colon cancer Maternal Grandmother     Hypertension Maternal Grandmother     Hypertension Paternal Grandmother     Diabetes Paternal Grandfather        Social History     Occupational History    Occupation:  production  Tobacco Use    Smoking status: Never Smoker    Smokeless tobacco: Never Used   Substance and Sexual Activity    Alcohol use:  Yes     Alcohol/week: 1 8 oz     Types: 3 Shots of liquor per week     Comment: occ    Drug use: No    Sexual activity: Not on file     Comment: Per [employer] - risk for STD partner with multiple partners       Current Outpatient Medications on File Prior to Visit   Medication Sig    ibuprofen (MOTRIN) 200 mg tablet Take by mouth every 6 (six) hours as needed for mild pain    naproxen (NAPROSYN) 500 mg tablet Take 1 tablet (500 mg total) by mouth 2 (two) times a day with meals for 5 days    naproxen (NAPROSYN) 500 mg tablet Take 1 tablet (500 mg total) by mouth 2 (two) times a day with meals for 5 days    [DISCONTINUED] cyclobenzaprine (FLEXERIL) 10 mg tablet Take 1 tablet (10 mg total) by mouth 3 (three) times a day as needed for muscle spasms (Patient not taking: Reported on 11/1/2018 )    [DISCONTINUED] ergocalciferol (VITAMIN D2) 50,000 units Take 1 capsule by mouth once a week    [DISCONTINUED] FLUoxetine (PROzac) 20 mg capsule Take 20 mg by mouth every morning    [DISCONTINUED] LATUDA 20 MG tablet Take 20 mg by mouth daily with breakfast    [DISCONTINUED] lidocaine (LIDODERM) 5 % Apply 1 patch topically daily Remove & Discard patch within 12 hours or as directed by MD     No current facility-administered medications on file prior to visit  No Known Allergies    Physical Exam:    /78   Pulse 98   Resp 12   Ht 5' (1 524 m)   Wt 80 5 kg (177 lb 6 4 oz)   BMI 34 65 kg/m²      LUMBAR  General: Well-developed, well-nourished individual in no acute distress  Mental: Appropriate mood and affect  Grossly oriented with coherent speech and thought processing   Neuro:  Cranial nerves: Cranial nerve function is grossly intact bilaterally   Strength: Bilateral lower extremity strength is normal and symmetric   No atrophy or tone abnormalities noted   Reflexes: Bilateral lower extremity muscle stretch reflexes are physiologic and symmetric   No ankle clonus is noted   Sensation: No loss of sensation is noted   SLR/Foraminal Compression Maneuvers: Straight leg raising in the sitting position is negative for radicular pain   Gait:  Gait/gross motor: Gait is normal  Station is normal  Toe walking, heel walking  are normal     Musculoskeletal:  Palpation: Inspection and palpation of the spine and extremities are unremarkable except for tenderness to palpation over the lumbar paraspinal musculature and centrally over the spinous processes reproducing her pain  Spine: Normal pain-free range of motion except for end range forward flexion which reproduces her back pain complaint she describes a tight sensation as well  No gross axial skeletal deformities   Skin: Skin inspection grossly negative for erythema, breakdown, or concerning lesions in affected area   Lymph: No lymphadenopathy is appreciated in the involved extremity   Vessels: No lower extremity edema   Lungs: Breathing is comfortable and regular  No dyspnea noted during examination       Eyes: Visual field grossly intact to confrontation  No redness appreciated  ENT: No craniofacial deformities or asymmetry  No neck masses appreciated           ImagingStudy Result     LUMBAR SPINE     INDICATION:   low back pain      COMPARISON:  None     VIEWS:  XR SPINE LUMBAR 2 OR 3 VIEWS INJURY  3     FINDINGS: Bone mineralization within normal limits      There is no evidence of acute fracture or destructive osseous lesion      Alignment is unremarkable      No significant lumbar degenerative change noted      The pedicles appear intact      Soft tissues are unremarkable         IMPRESSION:  Normal examination         Workstation performed: [id number]      Imaging     XR spine lumbar 2 or 3 views injury (Order: [id number]) - 1/15/2019

## 2019-03-09 ENCOUNTER — HOSPITAL ENCOUNTER (EMERGENCY)
Facility: HOSPITAL | Age: 34
Discharge: HOME | End: 2019-03-09
Attending: EMERGENCY MEDICINE | Admitting: EMERGENCY MEDICINE
Payer: COMMERCIAL

## 2019-03-09 ENCOUNTER — APPOINTMENT (EMERGENCY)
Dept: RADIOLOGY | Facility: HOSPITAL | Age: 34
End: 2019-03-09
Payer: COMMERCIAL

## 2019-03-09 VITALS
HEART RATE: 65 BPM | SYSTOLIC BLOOD PRESSURE: 120 MMHG | DIASTOLIC BLOOD PRESSURE: 83 MMHG | OXYGEN SATURATION: 97 % | RESPIRATION RATE: 18 BRPM

## 2019-03-09 DIAGNOSIS — M25.532 LEFT WRIST PAIN: Primary | ICD-10-CM

## 2019-03-09 PROCEDURE — 73110 X-RAY EXAM OF WRIST: CPT

## 2019-03-09 PROCEDURE — 99283 EMERGENCY DEPT VISIT LOW MDM: CPT

## 2019-03-09 RX ORDER — NAPROXEN 500 MG/1
500 TABLET ORAL 2 TIMES DAILY WITH MEALS
Qty: 30 TABLET | Refills: 0 | Status: SHIPPED | OUTPATIENT
Start: 2019-03-09 | End: 2022-05-09 | Stop reason: ALTCHOICE

## 2019-03-09 NOTE — ED NOTES
Patient states that she has no fallopian tubes and does not have to pee at this time  Patient verbalized understanding of risks of xray to pregnancy       Steve Villalobos RN  03/09/19 7024 Titi Kohler RN  03/09/19 1531

## 2019-03-09 NOTE — ED PROVIDER NOTES
History  Chief Complaint   Patient presents with    Hand Pain     Left wrist and hand discoloration and difficulty closing due to "tightness "     Pt is a 29year old female with a PMH of anxiety, depression, hypothyroidism presenting with left wrist pain x 1 day  Pt states there was no specific incident where she injured her wrist but she began having pain yesterday around her distal ulna  She has pain with inversion and eversion of the wrist, and decreased ROM  She states her hand feels "tight" and she cannot fully close her fist  She has full sensation and is neurovascularly in tact  She denies swelling, erythema, ecchymosis of the wrist  No prior injury  None       Past Medical History:   Diagnosis Date    Anxiety     ASCUS with positive high risk HPV cervical 03/2008    Chicken pox     Depression     Disease of thyroid gland     hypothyroid    Habitual aborter not currently pregnant     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       Past Surgical History:   Procedure Laterality Date    COLPOSCOPY  01/2013    LASIK      Last Assessed - 09 May 2016    SALPINGECTOMY Bilateral 05/2015    Scope bs    TOOTH EXTRACTION      TUBAL LIGATION         Family History   Problem Relation Age of Onset    Anxiety disorder Mother     Dementia Maternal Grandmother         Alzheimer's    Colon cancer Maternal Grandmother     Hypertension Maternal Grandmother     Hypertension Paternal Grandmother     Diabetes Paternal Grandfather      I have reviewed and agree with the history as documented  Social History     Tobacco Use    Smoking status: Never Smoker    Smokeless tobacco: Never Used   Substance Use Topics    Alcohol use: Not Currently     Alcohol/week: 1 8 oz     Types: 3 Shots of liquor per week     Comment: occ    Drug use: No        Review of Systems   Constitutional: Negative for activity change, appetite change, chills, diaphoresis, fatigue and fever     Respiratory: Negative for cough, chest tightness and shortness of breath  Cardiovascular: Negative for chest pain  Gastrointestinal: Negative for abdominal pain, constipation, diarrhea, nausea and vomiting  Genitourinary: Negative for decreased urine volume and difficulty urinating  Musculoskeletal: Positive for arthralgias  Negative for joint swelling  Skin: Negative for rash and wound  Neurological: Negative for dizziness, weakness, light-headedness and headaches  Physical Exam  Physical Exam   Constitutional: She is oriented to person, place, and time  She appears well-developed and well-nourished  No distress  HENT:   Head: Normocephalic and atraumatic  Eyes: Pupils are equal, round, and reactive to light  Conjunctivae and EOM are normal    Neck: Normal range of motion  Neck supple  Cardiovascular: Normal rate, regular rhythm, normal heart sounds and intact distal pulses  Pulses:       Radial pulses are 2+ on the right side, and 2+ on the left side  Pulmonary/Chest: Effort normal and breath sounds normal    Abdominal: Soft  Bowel sounds are normal  She exhibits no distension  There is no tenderness  Musculoskeletal:        Left wrist: She exhibits decreased range of motion, tenderness and bony tenderness  She exhibits no swelling, no effusion, no crepitus, no deformity and no laceration  Neurological: She is alert and oriented to person, place, and time  Skin: Skin is warm and dry  Capillary refill takes less than 2 seconds  She is not diaphoretic         Vital Signs  ED Triage Vitals [03/09/19 0400]   Temp Pulse Respirations Blood Pressure SpO2   -- 65 18 120/83 97 %      Temp src Heart Rate Source Patient Position - Orthostatic VS BP Location FiO2 (%)   -- Monitor Standing Right arm --      Pain Score       No Pain           Vitals:    03/09/19 0400   BP: 120/83   Pulse: 65   Patient Position - Orthostatic VS: Standing       Visual Acuity      ED Medications  Medications - No data to display    Diagnostic Studies  Results Reviewed     Procedure Component Value Units Date/Time    POCT pregnancy, urine [[id number]]     Lab Status:  No result                  XR wrist 3+ views LEFT   ED Interpretation by Kwasi Bradshaw PA-C (03/09 9863)   No acute findings                 Procedures  Procedures       Phone Contacts  ED Phone Contact    ED Course                               MDM  Number of Diagnoses or Management Options  Left wrist pain:   Diagnosis management comments: XR of the wrist negative for fracture or acute findings  Likely secondary to sprain or tendonitis  Educated on return precautions  Stable and ready for discharge  Disposition  Final diagnoses:   Left wrist pain     Time reflects when diagnosis was documented in both MDM as applicable and the Disposition within this note     Time User Action Codes Description Comment    3/9/2019  5:38 AM Ary Farmer [M25 532] Left wrist pain       ED Disposition     ED Disposition Condition Date/Time Comment    Discharge Good Sat Mar 9, 2019  5:38 AM Marty Torres discharge to home/self care  Follow-up Information     Follow up With Specialties Details Why Contact Info    Irene Joshi PA-C Family Medicine, Physician Assistant Schedule an appointment as soon as possible for a visit  As needed Macey Das [address]  [phone]            Discharge Medication List as of 3/9/2019  5:39 AM      START taking these medications    Details   naproxen (NAPROSYN) 500 mg tablet Take 1 tablet (500 mg total) by mouth 2 (two) times a day with meals, Starting Sat 3/9/2019, Print           No discharge procedures on file      ED Provider  Electronically Signed by           Kwasi Bradshaw PA-C  03/09/19 7048

## 2019-04-09 ENCOUNTER — APPOINTMENT (OUTPATIENT)
Dept: URGENT CARE | Facility: MEDICAL CENTER | Age: 34
End: 2019-04-09
Payer: OTHER MISCELLANEOUS

## 2019-04-09 ENCOUNTER — TRANSCRIBE ORDERS (OUTPATIENT)
Dept: URGENT CARE | Facility: MEDICAL CENTER | Age: 34
End: 2019-04-09

## 2019-04-09 ENCOUNTER — APPOINTMENT (OUTPATIENT)
Dept: LAB | Facility: MEDICAL CENTER | Age: 34
End: 2019-04-09
Payer: OTHER MISCELLANEOUS

## 2019-04-09 DIAGNOSIS — Z77.21 EXPOSURE TO BLOODBORNE PATHOGEN: Primary | ICD-10-CM

## 2019-04-09 DIAGNOSIS — Z77.21 EXPOSURE TO BLOODBORNE PATHOGEN: ICD-10-CM

## 2019-04-09 LAB — ALT SERPL W P-5'-P-CCNC: 25 U/L (ref 12–78)

## 2019-04-09 PROCEDURE — 87340 HEPATITIS B SURFACE AG IA: CPT

## 2019-04-09 PROCEDURE — 36415 COLL VENOUS BLD VENIPUNCTURE: CPT

## 2019-04-09 PROCEDURE — 86803 HEPATITIS C AB TEST: CPT

## 2019-04-09 PROCEDURE — 99283 EMERGENCY DEPT VISIT LOW MDM: CPT

## 2019-04-09 PROCEDURE — 86706 HEP B SURFACE ANTIBODY: CPT

## 2019-04-09 PROCEDURE — G0382 LEV 3 HOSP TYPE B ED VISIT: HCPCS

## 2019-04-09 PROCEDURE — 87389 HIV-1 AG W/HIV-1&-2 AB AG IA: CPT

## 2019-04-09 PROCEDURE — 84460 ALANINE AMINO (ALT) (SGPT): CPT

## 2019-04-10 LAB
HBV SURFACE AB SER-ACNC: 461.53 MIU/ML
HBV SURFACE AG SER QL: NORMAL
HCV AB SER QL: NORMAL
HIV 1+2 AB+HIV1 P24 AG SERPL QL IA: NORMAL

## 2019-05-27 ENCOUNTER — HOSPITAL ENCOUNTER (EMERGENCY)
Facility: HOSPITAL | Age: 34
Discharge: HOME | End: 2019-05-27
Attending: EMERGENCY MEDICINE
Payer: COMMERCIAL

## 2019-05-27 VITALS
WEIGHT: 180 LBS | DIASTOLIC BLOOD PRESSURE: 81 MMHG | BODY MASS INDEX: 35.15 KG/M2 | SYSTOLIC BLOOD PRESSURE: 118 MMHG | HEART RATE: 78 BPM | OXYGEN SATURATION: 100 % | TEMPERATURE: 98.2 F | RESPIRATION RATE: 16 BRPM

## 2019-05-27 DIAGNOSIS — M54.50 LOW BACK PAIN: Primary | ICD-10-CM

## 2019-05-27 PROCEDURE — 96372 THER/PROPH/DIAG INJ SC/IM: CPT

## 2019-05-27 PROCEDURE — 99283 EMERGENCY DEPT VISIT LOW MDM: CPT

## 2019-05-27 PROCEDURE — 99284 EMERGENCY DEPT VISIT MOD MDM: CPT | Performed by: EMERGENCY MEDICINE

## 2019-05-27 RX ORDER — METHYLPREDNISOLONE 4 MG/1
TABLET ORAL
Qty: 21 TABLET | Refills: 0 | Status: SHIPPED | OUTPATIENT
Start: 2019-05-27 | End: 2022-05-09 | Stop reason: ALTCHOICE

## 2019-05-27 RX ORDER — ACETAMINOPHEN 325 MG/1
975 TABLET ORAL ONCE
Status: COMPLETED | OUTPATIENT
Start: 2019-05-27 | End: 2019-05-27

## 2019-05-27 RX ORDER — METHOCARBAMOL 500 MG/1
500 TABLET, FILM COATED ORAL 2 TIMES DAILY
Qty: 20 TABLET | Refills: 0 | Status: SHIPPED | OUTPATIENT
Start: 2019-05-27 | End: 2022-05-09 | Stop reason: ALTCHOICE

## 2019-05-27 RX ORDER — KETOROLAC TROMETHAMINE 30 MG/ML
15 INJECTION, SOLUTION INTRAMUSCULAR; INTRAVENOUS ONCE
Status: COMPLETED | OUTPATIENT
Start: 2019-05-27 | End: 2019-05-27

## 2019-05-27 RX ORDER — LIDOCAINE 50 MG/G
1 PATCH TOPICAL ONCE
Status: DISCONTINUED | OUTPATIENT
Start: 2019-05-27 | End: 2019-05-27 | Stop reason: HOSPADM

## 2019-05-27 RX ORDER — DIAZEPAM 5 MG/1
5 TABLET ORAL ONCE
Status: DISCONTINUED | OUTPATIENT
Start: 2019-05-27 | End: 2019-05-27

## 2019-05-27 RX ADMIN — KETOROLAC TROMETHAMINE 15 MG: 30 INJECTION, SOLUTION INTRAMUSCULAR at 18:29

## 2019-05-27 RX ADMIN — ACETAMINOPHEN 975 MG: 325 TABLET ORAL at 18:27

## 2019-05-27 RX ADMIN — LIDOCAINE 1 PATCH: 50 PATCH CUTANEOUS at 18:28

## 2019-09-03 ENCOUNTER — APPOINTMENT (OUTPATIENT)
Dept: URGENT CARE | Facility: MEDICAL CENTER | Age: 34
End: 2019-09-03
Payer: OTHER MISCELLANEOUS

## 2019-09-03 ENCOUNTER — TRANSCRIBE ORDERS (OUTPATIENT)
Dept: URGENT CARE | Facility: MEDICAL CENTER | Age: 34
End: 2019-09-03

## 2019-09-03 ENCOUNTER — APPOINTMENT (OUTPATIENT)
Dept: RADIOLOGY | Facility: MEDICAL CENTER | Age: 34
End: 2019-09-03
Payer: OTHER MISCELLANEOUS

## 2019-09-03 DIAGNOSIS — M79.674 PAIN OF TOE OF RIGHT FOOT: Primary | ICD-10-CM

## 2019-09-03 DIAGNOSIS — M79.674 PAIN OF TOE OF RIGHT FOOT: ICD-10-CM

## 2019-09-03 PROCEDURE — G0382 LEV 3 HOSP TYPE B ED VISIT: HCPCS | Performed by: FAMILY MEDICINE

## 2019-09-03 PROCEDURE — 73660 X-RAY EXAM OF TOE(S): CPT

## 2019-09-03 PROCEDURE — 99283 EMERGENCY DEPT VISIT LOW MDM: CPT | Performed by: FAMILY MEDICINE

## 2019-09-24 ENCOUNTER — APPOINTMENT (OUTPATIENT)
Dept: URGENT CARE | Facility: MEDICAL CENTER | Age: 34
End: 2019-09-24
Payer: OTHER MISCELLANEOUS

## 2019-09-24 PROCEDURE — 99213 OFFICE O/P EST LOW 20 MIN: CPT | Performed by: FAMILY MEDICINE

## 2020-11-25 ENCOUNTER — APPOINTMENT (EMERGENCY)
Dept: RADIOLOGY | Facility: HOSPITAL | Age: 35
End: 2020-11-25
Payer: COMMERCIAL

## 2020-11-25 ENCOUNTER — HOSPITAL ENCOUNTER (EMERGENCY)
Facility: HOSPITAL | Age: 35
Discharge: HOME | End: 2020-11-25
Attending: EMERGENCY MEDICINE | Admitting: EMERGENCY MEDICINE
Payer: COMMERCIAL

## 2020-11-25 VITALS
RESPIRATION RATE: 16 BRPM | SYSTOLIC BLOOD PRESSURE: 145 MMHG | BODY MASS INDEX: 35.94 KG/M2 | DIASTOLIC BLOOD PRESSURE: 87 MMHG | TEMPERATURE: 98.6 F | WEIGHT: 184 LBS | HEART RATE: 98 BPM | OXYGEN SATURATION: 99 %

## 2020-11-25 DIAGNOSIS — R11.0 NAUSEA: ICD-10-CM

## 2020-11-25 DIAGNOSIS — R09.81 NASAL CONGESTION: ICD-10-CM

## 2020-11-25 DIAGNOSIS — M79.10 MYALGIA: ICD-10-CM

## 2020-11-25 DIAGNOSIS — Z20.822 SUSPECTED COVID-19 VIRUS INFECTION: Primary | ICD-10-CM

## 2020-11-25 DIAGNOSIS — R05.9 COUGH: ICD-10-CM

## 2020-11-25 PROCEDURE — 93005 ELECTROCARDIOGRAM TRACING: CPT

## 2020-11-25 PROCEDURE — 99284 EMERGENCY DEPT VISIT MOD MDM: CPT | Performed by: EMERGENCY MEDICINE

## 2020-11-25 PROCEDURE — 71045 X-RAY EXAM CHEST 1 VIEW: CPT

## 2020-11-25 PROCEDURE — 87637 SARSCOV2&INF A&B&RSV AMP PRB: CPT | Performed by: EMERGENCY MEDICINE

## 2020-11-25 PROCEDURE — 99285 EMERGENCY DEPT VISIT HI MDM: CPT

## 2020-11-25 RX ORDER — ONDANSETRON 4 MG/1
4 TABLET, FILM COATED ORAL EVERY 8 HOURS PRN
Qty: 12 TABLET | Refills: 0 | Status: SHIPPED | OUTPATIENT
Start: 2020-11-25 | End: 2020-11-25 | Stop reason: SDUPTHER

## 2020-11-25 RX ORDER — FLUTICASONE PROPIONATE 50 MCG
1 SPRAY, SUSPENSION (ML) NASAL DAILY
Qty: 16 G | Refills: 0 | Status: SHIPPED | OUTPATIENT
Start: 2020-11-25 | End: 2022-05-09 | Stop reason: ALTCHOICE

## 2020-11-25 RX ORDER — ONDANSETRON 4 MG/1
4 TABLET, FILM COATED ORAL EVERY 8 HOURS PRN
Qty: 12 TABLET | Refills: 0 | Status: SHIPPED | OUTPATIENT
Start: 2020-11-25 | End: 2022-05-09 | Stop reason: ALTCHOICE

## 2020-11-25 RX ORDER — ONDANSETRON 4 MG/1
4 TABLET, ORALLY DISINTEGRATING ORAL ONCE
Status: COMPLETED | OUTPATIENT
Start: 2020-11-25 | End: 2020-11-25

## 2020-11-25 RX ORDER — FLUTICASONE PROPIONATE 50 MCG
1 SPRAY, SUSPENSION (ML) NASAL DAILY
Qty: 16 G | Refills: 0 | Status: SHIPPED | OUTPATIENT
Start: 2020-11-25 | End: 2020-11-25 | Stop reason: SDUPTHER

## 2020-11-25 RX ADMIN — ONDANSETRON 4 MG: 4 TABLET, ORALLY DISINTEGRATING ORAL at 21:26

## 2020-11-27 LAB
ATRIAL RATE: 81 BPM
P AXIS: 54 DEGREES
PR INTERVAL: 148 MS
QRS AXIS: 33 DEGREES
QRSD INTERVAL: 80 MS
QT INTERVAL: 362 MS
QTC INTERVAL: 420 MS
T WAVE AXIS: 25 DEGREES
VENTRICULAR RATE: 81 BPM

## 2020-11-27 PROCEDURE — 93010 ELECTROCARDIOGRAM REPORT: CPT | Performed by: INTERNAL MEDICINE

## 2020-11-30 LAB
FLUAV RNA NPH QL NAA+PROBE: NOT DETECTED
FLUBV RNA NPH QL NAA+PROBE: NOT DETECTED
RSV RNA NPH QL NAA+PROBE: NOT DETECTED
SARS-COV-2 RNA NPH QL NAA+PROBE: DETECTED

## 2021-04-05 ENCOUNTER — HOSPITAL ENCOUNTER (EMERGENCY)
Facility: HOSPITAL | Age: 36
Discharge: HOME | End: 2021-04-05
Attending: EMERGENCY MEDICINE | Admitting: EMERGENCY MEDICINE
Payer: COMMERCIAL

## 2021-04-05 ENCOUNTER — APPOINTMENT (EMERGENCY)
Dept: RADIOLOGY | Facility: HOSPITAL | Age: 36
End: 2021-04-05
Payer: COMMERCIAL

## 2021-04-05 VITALS
TEMPERATURE: 98.2 F | DIASTOLIC BLOOD PRESSURE: 89 MMHG | OXYGEN SATURATION: 100 % | SYSTOLIC BLOOD PRESSURE: 141 MMHG | RESPIRATION RATE: 18 BRPM | BODY MASS INDEX: 35.15 KG/M2 | HEART RATE: 90 BPM | WEIGHT: 180 LBS

## 2021-04-05 DIAGNOSIS — R05.9 COUGH: Primary | ICD-10-CM

## 2021-04-05 DIAGNOSIS — R07.9 CHEST PAIN: ICD-10-CM

## 2021-04-05 LAB
ALBUMIN SERPL BCP-MCNC: 3.5 G/DL (ref 3.5–5)
ALP SERPL-CCNC: 117 U/L (ref 46–116)
ALT SERPL W P-5'-P-CCNC: 30 U/L (ref 12–78)
ANION GAP SERPL CALCULATED.3IONS-SCNC: 3 MMOL/L (ref 4–13)
AST SERPL W P-5'-P-CCNC: 18 U/L (ref 5–45)
BASOPHILS # BLD AUTO: 0.04 THOUSANDS/ΜL (ref 0–0.1)
BASOPHILS NFR BLD AUTO: 0 % (ref 0–1)
BILIRUB SERPL-MCNC: 0.12 MG/DL (ref 0.2–1)
BILIRUB UR QL STRIP: NEGATIVE
BUN SERPL-MCNC: 13 MG/DL (ref 5–25)
CALCIUM SERPL-MCNC: 8.8 MG/DL (ref 8.3–10.1)
CHLORIDE SERPL-SCNC: 108 MMOL/L (ref 100–108)
CLARITY UR: CLEAR
CO2 SERPL-SCNC: 26 MMOL/L (ref 21–32)
COLOR UR: YELLOW
COLOR, POC: NORMAL
CREAT SERPL-MCNC: 0.89 MG/DL (ref 0.6–1.3)
EOSINOPHIL # BLD AUTO: 0.19 THOUSAND/ΜL (ref 0–0.61)
EOSINOPHIL NFR BLD AUTO: 2 % (ref 0–6)
ERYTHROCYTE [DISTWIDTH] IN BLOOD BY AUTOMATED COUNT: 14.6 % (ref 11.6–15.1)
EXT PREG TEST URINE: NEGATIVE
EXT. CONTROL ED NAV: NORMAL
GFR SERPL CREATININE-BSD FRML MDRD: 85 ML/MIN/1.73SQ M
GLUCOSE SERPL-MCNC: 98 MG/DL (ref 65–140)
GLUCOSE UR STRIP-MCNC: NEGATIVE MG/DL
HCT VFR BLD AUTO: 39.2 % (ref 34.8–46.1)
HGB BLD-MCNC: 12.3 G/DL (ref 11.5–15.4)
HGB UR QL STRIP.AUTO: NEGATIVE
IMM GRANULOCYTES # BLD AUTO: 0.15 THOUSAND/UL (ref 0–0.2)
IMM GRANULOCYTES NFR BLD AUTO: 2 % (ref 0–2)
KETONES UR STRIP-MCNC: NEGATIVE MG/DL
LEUKOCYTE ESTERASE UR QL STRIP: NEGATIVE
LIPASE SERPL-CCNC: 69 U/L (ref 73–393)
LYMPHOCYTES # BLD AUTO: 2.4 THOUSANDS/ΜL (ref 0.6–4.47)
LYMPHOCYTES NFR BLD AUTO: 24 % (ref 14–44)
MCH RBC QN AUTO: 25.9 PG (ref 26.8–34.3)
MCHC RBC AUTO-ENTMCNC: 31.4 G/DL (ref 31.4–37.4)
MCV RBC AUTO: 83 FL (ref 82–98)
MONOCYTES # BLD AUTO: 0.8 THOUSAND/ΜL (ref 0.17–1.22)
MONOCYTES NFR BLD AUTO: 8 % (ref 4–12)
NEUTROPHILS # BLD AUTO: 6.51 THOUSANDS/ΜL (ref 1.85–7.62)
NEUTS SEG NFR BLD AUTO: 64 % (ref 43–75)
NITRITE UR QL STRIP: NEGATIVE
NRBC BLD AUTO-RTO: 0 /100 WBCS
PH UR STRIP.AUTO: 7 [PH] (ref 4.5–8)
PLATELET # BLD AUTO: 439 THOUSANDS/UL (ref 149–390)
PMV BLD AUTO: 9.1 FL (ref 8.9–12.7)
POTASSIUM SERPL-SCNC: 3.7 MMOL/L (ref 3.5–5.3)
PROT SERPL-MCNC: 7.8 G/DL (ref 6.4–8.2)
PROT UR STRIP-MCNC: NEGATIVE MG/DL
RBC # BLD AUTO: 4.74 MILLION/UL (ref 3.81–5.12)
SODIUM SERPL-SCNC: 137 MMOL/L (ref 136–145)
SP GR UR STRIP.AUTO: >=1.03 (ref 1–1.03)
UROBILINOGEN UR QL STRIP.AUTO: 1 E.U./DL
WBC # BLD AUTO: 10.09 THOUSAND/UL (ref 4.31–10.16)

## 2021-04-05 PROCEDURE — 83690 ASSAY OF LIPASE: CPT | Performed by: EMERGENCY MEDICINE

## 2021-04-05 PROCEDURE — 0241U HB NFCT DS VIR RESP RNA 4 TRGT: CPT | Performed by: EMERGENCY MEDICINE

## 2021-04-05 PROCEDURE — 85025 COMPLETE CBC W/AUTO DIFF WBC: CPT | Performed by: EMERGENCY MEDICINE

## 2021-04-05 PROCEDURE — 81025 URINE PREGNANCY TEST: CPT | Performed by: EMERGENCY MEDICINE

## 2021-04-05 PROCEDURE — 99285 EMERGENCY DEPT VISIT HI MDM: CPT

## 2021-04-05 PROCEDURE — 80053 COMPREHEN METABOLIC PANEL: CPT | Performed by: EMERGENCY MEDICINE

## 2021-04-05 PROCEDURE — 81003 URINALYSIS AUTO W/O SCOPE: CPT

## 2021-04-05 PROCEDURE — 99285 EMERGENCY DEPT VISIT HI MDM: CPT | Performed by: EMERGENCY MEDICINE

## 2021-04-05 PROCEDURE — 36415 COLL VENOUS BLD VENIPUNCTURE: CPT | Performed by: EMERGENCY MEDICINE

## 2021-04-05 PROCEDURE — 71045 X-RAY EXAM CHEST 1 VIEW: CPT

## 2021-04-05 PROCEDURE — 96374 THER/PROPH/DIAG INJ IV PUSH: CPT

## 2021-04-05 PROCEDURE — 93005 ELECTROCARDIOGRAM TRACING: CPT

## 2021-04-05 RX ORDER — FAMOTIDINE 20 MG/1
20 TABLET, FILM COATED ORAL 2 TIMES DAILY
Qty: 30 TABLET | Refills: 0 | Status: SHIPPED | OUTPATIENT
Start: 2021-04-05 | End: 2022-05-09 | Stop reason: ALTCHOICE

## 2021-04-05 RX ORDER — SUCRALFATE ORAL 1 G/10ML
1000 SUSPENSION ORAL ONCE
Status: DISCONTINUED | OUTPATIENT
Start: 2021-04-05 | End: 2021-04-05

## 2021-04-05 RX ORDER — BENZONATATE 100 MG/1
100 CAPSULE ORAL 3 TIMES DAILY PRN
Qty: 20 CAPSULE | Refills: 0 | Status: SHIPPED | OUTPATIENT
Start: 2021-04-05 | End: 2022-05-09 | Stop reason: ALTCHOICE

## 2021-04-05 RX ORDER — SUCRALFATE 1 G/1
1 TABLET ORAL ONCE
Status: COMPLETED | OUTPATIENT
Start: 2021-04-05 | End: 2021-04-05

## 2021-04-05 RX ADMIN — SUCRALFATE 1 G: 1 TABLET ORAL at 22:16

## 2021-04-05 RX ADMIN — FAMOTIDINE 20 MG: 10 INJECTION INTRAVENOUS at 22:16

## 2021-04-06 LAB
ATRIAL RATE: 89 BPM
FLUAV RNA RESP QL NAA+PROBE: NEGATIVE
FLUBV RNA RESP QL NAA+PROBE: NEGATIVE
P AXIS: 66 DEGREES
PR INTERVAL: 138 MS
QRS AXIS: 42 DEGREES
QRSD INTERVAL: 74 MS
QT INTERVAL: 372 MS
QTC INTERVAL: 452 MS
RSV RNA RESP QL NAA+PROBE: NEGATIVE
SARS-COV-2 RNA RESP QL NAA+PROBE: NEGATIVE
T WAVE AXIS: 36 DEGREES
VENTRICULAR RATE: 89 BPM

## 2021-04-06 PROCEDURE — 93010 ELECTROCARDIOGRAM REPORT: CPT | Performed by: INTERNAL MEDICINE

## 2021-04-06 NOTE — DISCHARGE INSTRUCTIONS
You're symptoms may be due to an upper respiratory infection or acid reflux  Please use the Tessalon Perles, Maalox, and Pepcid to treat your symptoms  Please follow up with your primary care doctor

## 2021-04-06 NOTE — ED ATTENDING ATTESTATION
4/5/2021  I, Erin Ibrahim MD, saw and evaluated the patient  I have discussed the patient with the resident/non-physician practitioner and agree with the resident's/non-physician practitioner's findings, Plan of Care, and MDM as documented in the resident's/non-physician practitioner's note, except where noted  All available labs and Radiology studies were reviewed  I was present for key portions of any procedure(s) performed by the resident/non-physician practitioner and I was immediately available to provide assistance  At this point I agree with the current assessment done in the Emergency Department    I have conducted an independent evaluation of this patient a history and physical is as follows:  Cough intermittent chest pain and abd pain Pain is epigastric in nature Currently no pain PE: alert nad heart reg lungs clear abd soft nontender MDM: will check cxr ekg and labs   ED Course         Critical Care Time  Procedures

## 2021-04-06 NOTE — ED PROVIDER NOTES
History  Chief Complaint   Patient presents with    Chest Pain     Sick for approximately one week  Cough, nasal drip, chest pain, intermittant abd pain  Denies being around anyone that is sick  Chest pain increases with cough  Jeremy Pedraza is a 29-year-old woman with no significant past medical history presenting with a cough and chest pain over last week  It has not improved with home treatment of DayQuil, ibuprofen, Tylenol  She has not been evaluated by her primary care provider or any other provider for the symptoms  The chest pain is located in her left chest wall, does not radiate, does not worsen with exertion or position  She has no cardiac history or family history of sudden death at early age  She describes chest pain as a burning sensation  The cough is dry  She does not feel short of breath, however she feels like she needs to take a deep breath occasionally  On review of systems, she also reports some epigastric pain  She had COVID in 2020  No nausea, vomiting, fevers, chills  She reports no history of blood clots, hemoptysis, calf swelling, recent trips or surgeries, calf pain  Prior to Admission Medications   Prescriptions Last Dose Informant Patient Reported?  Taking?   fluticasone (FLONASE) 50 mcg/act nasal spray   No No   Si spray into each nostril daily for 14 days   methocarbamol (ROBAXIN) 500 mg tablet   No No   Sig: Take 1 tablet (500 mg total) by mouth 2 (two) times a day   methylPREDNISolone 4 MG tablet therapy pack   No No   Sig: Use as directed on package   naproxen (NAPROSYN) 500 mg tablet   No No   Sig: Take 1 tablet (500 mg total) by mouth 2 (two) times a day with meals   ondansetron (ZOFRAN) 4 mg tablet   No No   Sig: Take 1 tablet (4 mg total) by mouth every 8 (eight) hours as needed for nausea or vomiting for up to 5 days      Facility-Administered Medications: None       Past Medical History:   Diagnosis Date    Anxiety     ASCUS with positive high risk HPV cervical 03/2008    Chicken pox     Depression     Disease of thyroid gland     hypothyroid    Habitual aborter not currently pregnant     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       Past Surgical History:   Procedure Laterality Date    COLPOSCOPY  01/2013    LASIK      Last Assessed - 09 May 2016    SALPINGECTOMY Bilateral 05/2015    Scope bs    TOOTH EXTRACTION      TUBAL LIGATION         Family History   Problem Relation Age of Onset    Anxiety disorder Mother     Dementia Maternal Grandmother         Alzheimer's    Colon cancer Maternal Grandmother     Hypertension Maternal Grandmother     Hypertension Paternal Grandmother     Diabetes Paternal Grandfather      I have reviewed and agree with the history as documented  E-Cigarette/Vaping    E-Cigarette Use Never User      E-Cigarette/Vaping Substances     Social History     Tobacco Use    Smoking status: Never Smoker    Smokeless tobacco: Never Used   Substance Use Topics    Alcohol use: Yes     Alcohol/week: 3 0 standard drinks     Types: 3 Shots of liquor per week     Comment: social    Drug use: No        Review of Systems   Constitutional: Negative for chills and fever  HENT: Negative for congestion, rhinorrhea and sinus pain  Respiratory: Positive for cough  Negative for shortness of breath  Cardiovascular: Negative for chest pain, palpitations and leg swelling  Gastrointestinal: Negative for abdominal pain, nausea and vomiting  All other systems reviewed and are negative  Physical Exam  ED Triage Vitals [04/05/21 2146]   Temperature Pulse Respirations Blood Pressure SpO2   98 2 °F (36 8 °C) 90 18 141/89 100 %      Temp Source Heart Rate Source Patient Position - Orthostatic VS BP Location FiO2 (%)   Oral -- -- -- --      Pain Score       3             Orthostatic Vital Signs  Vitals:    04/05/21 2146   BP: 141/89   Pulse: 90       Physical Exam  Vitals signs reviewed  Constitutional:       General: She is not in acute distress  Appearance: She is not ill-appearing or toxic-appearing  HENT:      Head: Normocephalic and atraumatic  Cardiovascular:      Rate and Rhythm: Normal rate and regular rhythm  Pulses:           Radial pulses are 2+ on the right side and 2+ on the left side  Heart sounds: Normal heart sounds  Pulmonary:      Effort: Pulmonary effort is normal  No tachypnea or respiratory distress  Breath sounds: Normal breath sounds  Chest:      Chest wall: No tenderness  Abdominal:      Palpations: Abdomen is soft  There is no mass  Tenderness: There is no abdominal tenderness  There is no guarding  Musculoskeletal:      Right lower leg: She exhibits no tenderness  No edema  Left lower leg: She exhibits no tenderness  No edema  Skin:     General: Skin is warm  Neurological:      Mental Status: She is alert  ED Medications  Medications   famotidine (PEPCID) injection 20 mg (20 mg Intravenous Given 4/5/21 2216)   sucralfate (CARAFATE) tablet 1 g (1 g Oral Given 4/5/21 2216)       Diagnostic Studies  Results Reviewed     Procedure Component Value Units Date/Time    COVID19, Influenza A/B, RSV PCR, [facility] [[id number]]  (Normal) Collected: 04/05/21 2215    Lab Status: Final result Specimen: Nares from Nasopharyngeal Swab Updated: 04/06/21 0017     SARS-CoV-2 Negative     INFLUENZA A PCR Negative     INFLUENZA B PCR Negative     RSV PCR Negative    Narrative: This test has been authorized by FDA under an EUA (Emergency Use Assay) for use by authorized laboratories  Clinical caution and judgement should be used with the interpretation of these results with consideration of the clinical impression and other laboratory testing  Testing reported as "Positive" or "Negative" has been proven to be accurate according to standard laboratory validation requirements    All testing is performed with control materials showing appropriate reactivity at standard intervals      Comprehensive metabolic panel [[id number]]  (Abnormal) Collected: 04/05/21 2215    Lab Status: Final result Specimen: Blood from Arm, Right Updated: 04/05/21 2243     Sodium 137 mmol/L      Potassium 3 7 mmol/L      Chloride 108 mmol/L      CO2 26 mmol/L      ANION GAP 3 mmol/L      BUN 13 mg/dL      Creatinine 0 89 mg/dL      Glucose 98 mg/dL      Calcium 8 8 mg/dL      AST 18 U/L      ALT 30 U/L      Alkaline Phosphatase 117 U/L      Total Protein 7 8 g/dL      Albumin 3 5 g/dL      Total Bilirubin 0 12 mg/dL      eGFR 85 ml/min/1 73sq m     Narrative:      National Kidney Disease Foundation guidelines for Chronic Kidney Disease (CKD):     Stage 1 with normal or high GFR (GFR > 90 mL/min/1 73 square meters)    Stage 2 Mild CKD (GFR = 60-89 mL/min/1 73 square meters)    Stage 3A Moderate CKD (GFR = 45-59 mL/min/1 73 square meters)    Stage 3B Moderate CKD (GFR = 30-44 mL/min/1 73 square meters)    Stage 4 Severe CKD (GFR = 15-29 mL/min/1 73 square meters)    Stage 5 End Stage CKD (GFR <15 mL/min/1 73 square meters)  Note: GFR calculation is accurate only with a steady state creatinine    Lipase [[id number]]  (Abnormal) Collected: 04/05/21 2215    Lab Status: Final result Specimen: Blood from Arm, Right Updated: 04/05/21 2243     Lipase 69 u/L     CBC and differential [[id number]]  (Abnormal) Collected: 04/05/21 2215    Lab Status: Final result Specimen: Blood from Arm, Right Updated: 04/05/21 2231     WBC 10 09 Thousand/uL      RBC 4 74 Million/uL      Hemoglobin 12 3 g/dL      Hematocrit 39 2 %      MCV 83 fL      MCH 25 9 pg      MCHC 31 4 g/dL      RDW 14 6 %      MPV 9 1 fL      Platelets 093 Thousands/uL      nRBC 0 /100 WBCs      Neutrophils Relative 64 %      Immat GRANS % 2 %      Lymphocytes Relative 24 %      Monocytes Relative 8 %      Eosinophils Relative 2 %      Basophils Relative 0 %      Neutrophils Absolute 6 51 Thousands/µL      Immature Grans Absolute 0 15 Thousand/uL      Lymphocytes Absolute 2 40 Thousands/µL      Monocytes Absolute 0 80 Thousand/µL      Eosinophils Absolute 0 19 Thousand/µL      Basophils Absolute 0 04 Thousands/µL     POCT pregnancy, urine [[id number]]  (Normal) Resulted: 04/05/21 2225    Lab Status: Final result Updated: 04/05/21 2226     EXT PREG TEST UR (Ref: Negative) Negative     Control Valid    POCT urinalysis dipstick [[id number]]  (Normal) Resulted: 04/05/21 2225    Lab Status: Final result Specimen: Urine Updated: 04/05/21 2225     Color, UA see chart    Urine Macroscopic, POC [[id number]] Collected: 04/05/21 2223    Lab Status: Final result Specimen: Urine Updated: 04/05/21 2225     Color, UA Yellow     Clarity, UA Clear     pH, UA 7 0     Leukocytes, UA Negative     Nitrite, UA Negative     Protein, UA Negative mg/dl      Glucose, UA Negative mg/dl      Ketones, UA Negative mg/dl      Urobilinogen, UA 1 0 E U /dl      Bilirubin, UA Negative     Blood, UA Negative     Specific Gravity, UA >=1 030    Narrative:      CLINITEK RESULT                 XR chest 1 view portable   ED Interpretation by Reinaldo Rush MD (04/05 2310)   Unchanged from previous, no focal consolidations concerning for pneumonia  No pneumothorax  Procedures  ECG 12 Lead Documentation Only    Date/Time: 4/6/2021 5:24 AM  Performed by: Reinaldo Rush MD  Authorized by:  Reinaldo Rush MD             ED Course  ED Course as of Apr 06 0529 Mon Apr 05, 2021 2250 PREGNANCY TEST URINE: Negative   2250 Leukocytes, UA: Negative   2251 Nitrite, UA: Negative             HEART Risk Score      Most Recent Value   Heart Score Risk Calculator   History  0 Filed at: 04/06/2021 0528   ECG  0 Filed at: 04/06/2021 0528   Age  0 Filed at: 04/06/2021 0528   Risk Factors  0 Filed at: 04/06/2021 0528   Troponin  0 Filed at: 04/06/2021 0528   HEART Score  0 Filed at: 04/06/2021 7041                      SBIRT 22yo+      Most Recent Value   SBIRT (23 yo +)   In order to provide better care to our patients, we are screening all of our patients for alcohol and drug use  Would it be okay to ask you these screening questions? Unable to answer at this time Filed at: 04/05/2021 2226          Wells' Criteria for PE      Most Recent Value   Wells' Criteria for PE   Clinical signs and symptoms of DVT  0 Filed at: 04/06/2021 4847   PE is primary diagnosis or equally likely  0 Filed at: 04/06/2021 0528   HR >100  0 Filed at: 04/06/2021 0528   Immobilization at least 3 days or Surgery in the previous 4 weeks  0 Filed at: 04/06/2021 2823   Previous, objectively diagnosed PE or DVT  0 Filed at: 04/06/2021 0528   Hemoptysis  0 Filed at: 04/06/2021 7007   Malignancy with treatment within 6 months or palliative  0 Filed at: 04/06/2021 4915   Wells' Criteria Total  0 Filed at: 04/06/2021 4732            MDM  Number of Diagnoses or Management Options  Chest pain:   Cough:   Diagnosis management comments: 73-year-old woman presenting with cough and chest pain over last week  Concerning for ACS, pneumonia, pneumothorax, GERD, UTI with post-nasal drip  No signs in the history or physical exam of PE  Will evaluate with chest x-ray, EKG, CBC, lipase, CMP, urine pregnancy, UA, and COVID swab  Will treat symptoms in the ED with Carafate, Maalox, Pepcid  Work up unremarkable  Symptoms most likely GERD or UTI with post-nasal drip  Patient discharged with prescription for Tessalon Perles, Maalox, Pepcid; instructed to follow up with PCP         Disposition  Final diagnoses:   Cough   Chest pain     Time reflects when diagnosis was documented in both MDM as applicable and the Disposition within this note     Time User Action Codes Description Comment    4/5/2021 11:11 PM [place] Add [R05] Cough     4/5/2021 11:11 PM [place] Add [R07 9] Chest pain       ED Disposition     ED Disposition Condition Date/Time Comment    Discharge Stable Mon Apr 5, 2021 11:10 PM Jeremy Pastures discharge to home/self care             Follow-up Information     Follow up With Specialties Details Why [facility], [address]  [address] [facility] [address]             Discharge Medication List as of 4/5/2021 11:13 PM      START taking these medications    Details   aluminum-magnesium hydroxide 200-200 MG/5ML suspension Take 10 mL by mouth every 6 (six) hours as needed for heartburn, Starting Mon 4/5/2021, Print      benzonatate (TESSALON PERLES) 100 mg capsule Take 1 capsule (100 mg total) by mouth 3 (three) times a day as needed for cough, Starting Mon 4/5/2021, Print      famotidine (PEPCID) 20 mg tablet Take 1 tablet (20 mg total) by mouth 2 (two) times a day for 14 days, Starting Mon 4/5/2021, Until Mon 4/19/2021, Print         CONTINUE these medications which have NOT CHANGED    Details   fluticasone (FLONASE) 50 mcg/act nasal spray 1 spray into each nostril daily for 14 days, Starting Wed 11/25/2020, Until Wed 12/9/2020, Normal      methocarbamol (ROBAXIN) 500 mg tablet Take 1 tablet (500 mg total) by mouth 2 (two) times a day, Starting Mon 5/27/2019, Print      methylPREDNISolone 4 MG tablet therapy pack Use as directed on package, Print      naproxen (NAPROSYN) 500 mg tablet Take 1 tablet (500 mg total) by mouth 2 (two) times a day with meals, Starting Sat 3/9/2019, Print      ondansetron (ZOFRAN) 4 mg tablet Take 1 tablet (4 mg total) by mouth every 8 (eight) hours as needed for nausea or vomiting for up to 5 days, Starting Wed 11/25/2020, Until Mon 11/30/2020, Normal           No discharge procedures on file  PDMP Review     None           ED Provider  Attending physically available and evaluated Jamel Godinez I managed the patient along with the ED Attending      Electronically Signed by         Keyla Cisneros MD  04/06/21 Den oJhnston MD  04/06/21 1002

## 2021-04-07 NOTE — QUICK NOTE
4/7/21 0857    Patient notified of negative result  Discussed with patient that they should go at least 3 days without symptoms prior to discontinuing the self quarantine  Patient voiced understanding of results and instructions  Questions were answered

## 2021-09-08 ENCOUNTER — APPOINTMENT (OUTPATIENT)
Dept: LAB | Facility: HOSPITAL | Age: 36
End: 2021-09-08
Payer: COMMERCIAL

## 2021-09-08 DIAGNOSIS — Z31.49 OTHER INVESTIGATION AND TESTING FOR PROCREATIVE MANAGEMENT: ICD-10-CM

## 2021-09-08 LAB
ESTRADIOL SERPL-MCNC: 298 PG/ML
PROGEST SERPL-MCNC: 31.1 NG/ML

## 2021-09-08 PROCEDURE — 36415 COLL VENOUS BLD VENIPUNCTURE: CPT

## 2021-09-08 PROCEDURE — 82670 ASSAY OF TOTAL ESTRADIOL: CPT

## 2021-09-08 PROCEDURE — 84144 ASSAY OF PROGESTERONE: CPT

## 2021-09-14 ENCOUNTER — APPOINTMENT (OUTPATIENT)
Dept: LAB | Facility: HOSPITAL | Age: 36
End: 2021-09-14
Payer: COMMERCIAL

## 2021-09-14 DIAGNOSIS — Z31.41 FERTILITY TESTING: ICD-10-CM

## 2021-09-14 DIAGNOSIS — Z32.00 PREGNANCY EXAMINATION OR TEST, PREGNANCY UNCONFIRMED: ICD-10-CM

## 2021-09-14 LAB
B-HCG SERPL-ACNC: <2 MIU/ML
PROGEST SERPL-MCNC: 32.7 NG/ML
TSH SERPL DL<=0.05 MIU/L-ACNC: 1.89 UIU/ML (ref 0.36–3.74)

## 2021-09-14 PROCEDURE — 84144 ASSAY OF PROGESTERONE: CPT

## 2021-09-14 PROCEDURE — 84702 CHORIONIC GONADOTROPIN TEST: CPT

## 2021-09-14 PROCEDURE — 36415 COLL VENOUS BLD VENIPUNCTURE: CPT

## 2021-09-14 PROCEDURE — 84443 ASSAY THYROID STIM HORMONE: CPT

## 2021-10-11 ENCOUNTER — APPOINTMENT (OUTPATIENT)
Dept: LAB | Facility: HOSPITAL | Age: 36
End: 2021-10-11
Payer: COMMERCIAL

## 2021-10-18 ENCOUNTER — APPOINTMENT (OUTPATIENT)
Dept: LAB | Facility: HOSPITAL | Age: 36
End: 2021-10-18
Payer: COMMERCIAL

## 2022-01-28 ENCOUNTER — HOSPITAL ENCOUNTER (EMERGENCY)
Facility: HOSPITAL | Age: 37
Discharge: HOME | End: 2022-01-28
Attending: EMERGENCY MEDICINE | Admitting: EMERGENCY MEDICINE
Payer: COMMERCIAL

## 2022-01-28 VITALS
OXYGEN SATURATION: 99 % | RESPIRATION RATE: 16 BRPM | SYSTOLIC BLOOD PRESSURE: 144 MMHG | DIASTOLIC BLOOD PRESSURE: 93 MMHG | HEART RATE: 95 BPM | TEMPERATURE: 97.9 F

## 2022-01-28 DIAGNOSIS — R55 NEAR SYNCOPE: Primary | ICD-10-CM

## 2022-01-28 LAB
ATRIAL RATE: 97 BPM
EXT PREG TEST URINE: NEGATIVE
EXT. CONTROL ED NAV: NORMAL
GLUCOSE SERPL-MCNC: 99 MG/DL (ref 65–140)
P AXIS: 63 DEGREES
PR INTERVAL: 152 MS
QRS AXIS: 25 DEGREES
QRSD INTERVAL: 80 MS
QT INTERVAL: 346 MS
QTC INTERVAL: 439 MS
T WAVE AXIS: 26 DEGREES
VENTRICULAR RATE: 97 BPM

## 2022-01-28 PROCEDURE — 82948 REAGENT STRIP/BLOOD GLUCOSE: CPT

## 2022-01-28 PROCEDURE — 93005 ELECTROCARDIOGRAM TRACING: CPT

## 2022-01-28 PROCEDURE — 81025 URINE PREGNANCY TEST: CPT

## 2022-01-28 PROCEDURE — 93010 ELECTROCARDIOGRAM REPORT: CPT | Performed by: INTERNAL MEDICINE

## 2022-01-28 PROCEDURE — 99285 EMERGENCY DEPT VISIT HI MDM: CPT | Performed by: EMERGENCY MEDICINE

## 2022-01-28 PROCEDURE — 99284 EMERGENCY DEPT VISIT MOD MDM: CPT

## 2022-01-28 NOTE — ED PROVIDER NOTES
History  Chief Complaint   Patient presents with    Syncope     states she fell over and friends caught her  Felt it coming and like she was getting heavier and fell over  States she was alert the whole time but unable to respond     Patient is a 29year old female with a past medical history of anxiety and depression currently presenting with an episode of "nearly passing out"  She states that approximately 3 hours prior to arrival she was walking and felt "flushed" and nauseous and as if she would pass out  She was able to lower herself and friends caught her prior to falling  She did not hit head or lose consciousness  She remembers the entire event  As per friends there was no seizure activity  She states that she felt weak afterwards, and friends brought her some juice and food and she felt better  She admits to not eating much that day  She has never had this in the past  She denies chest pain, shortness of breath  She has no family history of sudden death  She denies history of blood clots, leg swelling, warmth, tenderness, or OCP use  She has no headaches, dizziness of visual changes  Prior to Admission Medications   Prescriptions Last Dose Informant Patient Reported?  Taking?   aluminum-magnesium hydroxide 200-200 MG/5ML suspension   No No   Sig: Take 10 mL by mouth every 6 (six) hours as needed for heartburn   benzonatate (TESSALON PERLES) 100 mg capsule   No No   Sig: Take 1 capsule (100 mg total) by mouth 3 (three) times a day as needed for cough   famotidine (PEPCID) 20 mg tablet   No No   Sig: Take 1 tablet (20 mg total) by mouth 2 (two) times a day for 14 days   fluticasone (FLONASE) 50 mcg/act nasal spray   No No   Si spray into each nostril daily for 14 days   methocarbamol (ROBAXIN) 500 mg tablet   No No   Sig: Take 1 tablet (500 mg total) by mouth 2 (two) times a day   methylPREDNISolone 4 MG tablet therapy pack   No No   Sig: Use as directed on package   naproxen (NAPROSYN) 500 mg tablet   No No   Sig: Take 1 tablet (500 mg total) by mouth 2 (two) times a day with meals   ondansetron (ZOFRAN) 4 mg tablet   No No   Sig: Take 1 tablet (4 mg total) by mouth every 8 (eight) hours as needed for nausea or vomiting for up to 5 days      Facility-Administered Medications: None       Past Medical History:   Diagnosis Date    Anxiety     ASCUS with positive high risk HPV cervical 03/2008    Chicken pox     Depression     Disease of thyroid gland     hypothyroid    Habitual aborter not currently pregnant     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       Past Surgical History:   Procedure Laterality Date    COLPOSCOPY  01/2013    LASIK      Last Assessed - 09 May 2016    SALPINGECTOMY Bilateral 05/2015    Scope bs    TOOTH EXTRACTION         Family History   Problem Relation Age of Onset    Anxiety disorder Mother     Dementia Maternal Grandmother         Alzheimer's    Colon cancer Maternal Grandmother     Hypertension Maternal Grandmother     Hypertension Paternal Grandmother     Diabetes Paternal Grandfather      I have reviewed and agree with the history as documented  E-Cigarette/Vaping    E-Cigarette Use Never User      E-Cigarette/Vaping Substances     Social History     Tobacco Use    Smoking status: Never Smoker    Smokeless tobacco: Never Used   Vaping Use    Vaping Use: Never used   Substance Use Topics    Alcohol use: Yes     Alcohol/week: 3 0 standard drinks     Types: 3 Shots of liquor per week     Comment: social    Drug use: No        Review of Systems   Constitutional: Negative for chills and fever  HENT: Negative for ear pain, rhinorrhea and sore throat  Eyes: Negative for pain  Respiratory: Negative for shortness of breath  Cardiovascular: Negative for chest pain  Gastrointestinal: Negative for abdominal pain, constipation, diarrhea, nausea and vomiting  Genitourinary: Negative for dysuria  Musculoskeletal: Negative for myalgias  Skin: Negative for rash  Neurological: Positive for syncope  Negative for dizziness, light-headedness and headaches  Psychiatric/Behavioral: Negative for agitation  All other systems reviewed and are negative  Physical Exam  ED Triage Vitals [01/28/22 1102]   Temperature Pulse Respirations Blood Pressure SpO2   97 9 °F (36 6 °C) 95 16 144/93 99 %      Temp Source Heart Rate Source Patient Position - Orthostatic VS BP Location FiO2 (%)   Tympanic Monitor Sitting Left arm --      Pain Score       No Pain             Orthostatic Vital Signs  Vitals:    01/28/22 1102   BP: 144/93   Pulse: 95   Patient Position - Orthostatic VS: Sitting       Physical Exam  Vitals and nursing note reviewed  Constitutional:       General: She is not in acute distress  Appearance: Normal appearance  HENT:      Head: Normocephalic and atraumatic  Right Ear: External ear normal       Left Ear: External ear normal       Nose: Nose normal    Eyes:      General: No scleral icterus  Right eye: No discharge  Left eye: No discharge  Extraocular Movements: Extraocular movements intact  Conjunctiva/sclera: Conjunctivae normal       Pupils: Pupils are equal, round, and reactive to light  Cardiovascular:      Rate and Rhythm: Normal rate and regular rhythm  Pulses: Normal pulses  Heart sounds: Normal heart sounds  No murmur heard  No friction rub  No gallop  Pulmonary:      Effort: Pulmonary effort is normal  No respiratory distress  Breath sounds: Normal breath sounds  Abdominal:      General: Abdomen is flat  Bowel sounds are normal  There is no distension  Palpations: Abdomen is soft  There is no mass  Tenderness: There is no abdominal tenderness  Musculoskeletal:         General: Normal range of motion  Cervical back: Normal range of motion  Right lower leg: No swelling or tenderness  No edema  Left lower leg: No swelling or tenderness  No edema  Skin:     General: Skin is warm and dry  Neurological:      General: No focal deficit present  Mental Status: She is alert and oriented to person, place, and time  GCS: GCS eye subscore is 4  GCS verbal subscore is 5  GCS motor subscore is 6  Cranial Nerves: Cranial nerves are intact  No facial asymmetry  Sensory: Sensation is intact  No sensory deficit  Motor: Motor function is intact  No pronator drift  Coordination: Coordination is intact  Finger-Nose-Finger Test and Heel to [facility] Test normal       Gait: Gait is intact     Psychiatric:         Mood and Affect: Mood normal          ED Medications  Medications - No data to display    Diagnostic Studies  Results Reviewed     Procedure Component Value Units Date/Time    POCT pregnancy, urine [[id number]]  (Normal) Resulted: 01/28/22 1209    Lab Status: Final result Updated: 01/28/22 1210     EXT PREG TEST UR (Ref: Negative) Negative     Control valid    Fingerstick Glucose (POCT) [[id number]]  (Normal) Collected: 01/28/22 1152    Lab Status: Final result Updated: 01/28/22 1155     POC Glucose 99 mg/dl                  No orders to display         Procedures  ECG 12 Lead Documentation Only    Date/Time: 1/28/2022 11:30 AM  Performed by: Piotr Nichole DO  Authorized by: Piotr Nichole DO     Indications / Diagnosis:  Near syncope  Patient location:  ED  Previous ECG:     Previous ECG:  Compared to current    Similarity:  No change  Interpretation:     Interpretation: abnormal    Rate:     ECG rate:  97    ECG rate assessment: normal    Rhythm:     Rhythm: sinus rhythm    QRS:     QRS axis:  Normal  Conduction:     Conduction: normal    ST segments:     ST segments:  Non-specific  T waves:     T waves: normal            ED Course  ED Course as of 01/28/22 1953 Fri Jan 28, 2022   1214 POC Glucose: 99   1214 PREGNANCY TEST URINE: Negative                                       MDM  Number of Diagnoses or Management Options  Near syncope: new and requires workup  Diagnosis management comments: Patient is a 29year old female presenting after a near syncopal episode  Based on history and evaluation differential diagnosis includes but is not limited to: vasovagal near syncope, orthostatic hypotension with near syncope, hypoglycemia, cardiogenic near syncope  Plan: ECG, fingerstick glucose, pregnancy    ECG did not show acute ischemia, similar to prior  Fingerstick within normal limits  Preg test negative  Based on patient evaluation and clinical exam, she appears to have had a near syncopal episode  Will plan to discharge home  Discussed plan with patient who seems to understand and is agreeable  All questions answered  Patient discharged home with return precautions  Amount and/or Complexity of Data Reviewed  Review and summarize past medical records: yes  Independent visualization of images, tracings, or specimens: yes    Risk of Complications, Morbidity, and/or Mortality  Presenting problems: moderate  Diagnostic procedures: low  Management options: low    Patient Progress  Patient progress: stable      Disposition  Final diagnoses:   Near syncope     Time reflects when diagnosis was documented in both MDM as applicable and the Disposition within this note     Time User Action Codes Description Comment    1/28/2022 12:27 PM [address] [R55] Near syncope       ED Disposition     ED Disposition Condition Date/Time Comment    Discharge Stable Fri Jan 28, 2022 12:27 PM Tom Marrero discharge to home/self care              Follow-up Information     Follow up With Specialties Details Why Contact Aki Horton, [address] [place]  [address] [facility]  Kaleb Hopper  [address]             Discharge Medication List as of 1/28/2022 12:29 PM      CONTINUE these medications which have NOT CHANGED    Details   aluminum-magnesium hydroxide 200-200 MG/5ML suspension Take 10 mL by mouth every 6 (six) hours as needed for heartburn, Starting Mon 4/5/2021, Print      benzonatate (TESSALON PERLES) 100 mg capsule Take 1 capsule (100 mg total) by mouth 3 (three) times a day as needed for cough, Starting Mon 4/5/2021, Print      famotidine (PEPCID) 20 mg tablet Take 1 tablet (20 mg total) by mouth 2 (two) times a day for 14 days, Starting Mon 4/5/2021, Until Mon 4/19/2021, Print      fluticasone (FLONASE) 50 mcg/act nasal spray 1 spray into each nostril daily for 14 days, Starting Wed 11/25/2020, Until Wed 12/9/2020, Normal      methocarbamol (ROBAXIN) 500 mg tablet Take 1 tablet (500 mg total) by mouth 2 (two) times a day, Starting Mon 5/27/2019, Print      methylPREDNISolone 4 MG tablet therapy pack Use as directed on package, Print      naproxen (NAPROSYN) 500 mg tablet Take 1 tablet (500 mg total) by mouth 2 (two) times a day with meals, Starting Sat 3/9/2019, Print      ondansetron (ZOFRAN) 4 mg tablet Take 1 tablet (4 mg total) by mouth every 8 (eight) hours as needed for nausea or vomiting for up to 5 days, Starting Wed 11/25/2020, Until Mon 11/30/2020, Normal           No discharge procedures on file  PDMP Review     None           ED Provider  Attending physically available and evaluated Oz Reza I managed the patient along with the ED Attending      Electronically Signed by         Alli Arroyo DO  01/28/22 1953

## 2022-01-28 NOTE — DISCHARGE INSTRUCTIONS
You were seen in the ED today with a near syncopal episode  We did not find an emergent cause of your symptoms today  Please return to the ED if you experience any of the return precautions on the attached form, or any other concerns

## 2022-01-28 NOTE — ED ATTENDING ATTESTATION
1/28/2022  IDevendra MD, saw and evaluated the patient  I have discussed the patient with the resident/non-physician practitioner and agree with the resident's/non-physician practitioner's findings, Plan of Care, and MDM as documented in the resident's/non-physician practitioner's note, except where noted  All available labs and Radiology studies were reviewed  I was present for key portions of any procedure(s) performed by the resident/non-physician practitioner and I was immediately available to provide assistance  At this point I agree with the current assessment done in the Emergency Department  I have conducted an independent evaluation of this patient a history and physical is as follows:    ED Course     73-year-old female, previously healthy, presenting to the emergency department for evaluation of near syncopal episode  Patient was ambulating through her house when she felt warm and flushed associated with nausea  The patient then felt a global body heaviness and was lowered to the ground by friends  The patient was given something to eat and drink, and after several minutes had resolution of symptoms  Since that time the patient has been feeling tired  No associated palpitations, chest pain or shortness of breath  No new lower extremity pain or swelling  No personal or family history of DVT or PE  No family history of early sudden cardiac death  Ten systems reviewed and negative except as noted  The patient is resting comfortably on a stretcher in no acute respiratory distress  The patient appears nontoxic  HEENT reveals moist mucous membranes  Head is normocephalic and atraumatic  Conjunctiva and sclera are normal  Neck is nontender and supple with full range of motion to flexion, extension, lateral rotation  No meningismus appreciated  No masses are appreciated  Lungs are clear to auscultation bilaterally without any wheezes, rales or rhonchi   Heart is regular rate and rhythm without any murmurs, rubs or gallops  Abdomen is soft and nontender without any rebound or guarding  Extremities appear grossly normal without any significant arthropathy  Patient is awake, alert, and oriented x3  The patient has normal interaction  Motor is 5 out of 5  Assessment and plan:  Vasovagal near syncope      Labs Reviewed   POCT PREGNANCY, URINE - Normal       Result Value Ref Range Status    EXT PREG TEST UR (Ref: Negative) Negative   Final    Control valid   Final   POCT GLUCOSE - Normal    POC Glucose 99  65 - 140 mg/dl Final       No orders to display           Critical Care Time  Procedures

## 2022-03-08 ENCOUNTER — APPOINTMENT (OUTPATIENT)
Dept: LAB | Facility: HOSPITAL | Age: 37
End: 2022-03-08
Payer: COMMERCIAL

## 2022-03-08 DIAGNOSIS — Z31.83 ENCOUNTER FOR ASSISTED REPRODUCTIVE FERTILITY CYCLE: ICD-10-CM

## 2022-03-08 LAB
B-HCG SERPL-ACNC: <2 MIU/ML
ESTRADIOL SERPL-MCNC: 20 PG/ML
FSH SERPL-ACNC: 5.8 MIU/ML
LH SERPL-ACNC: 3.8 MIU/ML
PROGEST SERPL-MCNC: <0.2 NG/ML
TSH SERPL DL<=0.05 MIU/L-ACNC: 3.05 UIU/ML (ref 0.36–3.74)

## 2022-03-08 PROCEDURE — 83001 ASSAY OF GONADOTROPIN (FSH): CPT

## 2022-03-08 PROCEDURE — 84702 CHORIONIC GONADOTROPIN TEST: CPT

## 2022-03-08 PROCEDURE — 84144 ASSAY OF PROGESTERONE: CPT

## 2022-03-08 PROCEDURE — 84443 ASSAY THYROID STIM HORMONE: CPT

## 2022-03-08 PROCEDURE — 36415 COLL VENOUS BLD VENIPUNCTURE: CPT

## 2022-03-08 PROCEDURE — 82670 ASSAY OF TOTAL ESTRADIOL: CPT

## 2022-03-08 PROCEDURE — 83002 ASSAY OF GONADOTROPIN (LH): CPT

## 2022-03-11 ENCOUNTER — APPOINTMENT (OUTPATIENT)
Dept: LAB | Facility: HOSPITAL | Age: 37
End: 2022-03-11
Payer: COMMERCIAL

## 2022-03-11 DIAGNOSIS — Z31.83 ENCOUNTER FOR ASSISTED REPRODUCTIVE FERTILITY PROCEDURE CYCLE: ICD-10-CM

## 2022-03-11 LAB
ESTRADIOL SERPL-MCNC: 211 PG/ML
LH SERPL-ACNC: 1.3 MIU/ML
PROGEST SERPL-MCNC: 0.4 NG/ML

## 2022-03-11 PROCEDURE — 36415 COLL VENOUS BLD VENIPUNCTURE: CPT

## 2022-03-11 PROCEDURE — 83002 ASSAY OF GONADOTROPIN (LH): CPT

## 2022-03-11 PROCEDURE — 84144 ASSAY OF PROGESTERONE: CPT

## 2022-03-11 PROCEDURE — 82670 ASSAY OF TOTAL ESTRADIOL: CPT

## 2022-03-16 ENCOUNTER — APPOINTMENT (OUTPATIENT)
Dept: LAB | Facility: HOSPITAL | Age: 37
End: 2022-03-16
Payer: COMMERCIAL

## 2022-03-16 DIAGNOSIS — Z31.83 ENCOUNTER FOR ASSISTED REPRODUCTIVE FERTILITY PROCEDURE CYCLE: ICD-10-CM

## 2022-03-16 DIAGNOSIS — Z31.83 ENCOUNTER FOR ASSISTED REPRODUCTIVE FERTILITY CYCLE: ICD-10-CM

## 2022-03-16 LAB
ESTRADIOL SERPL-MCNC: 2089 PG/ML
LH SERPL-ACNC: 5.2 MIU/ML
PROGEST SERPL-MCNC: 1 NG/ML

## 2022-03-16 PROCEDURE — 36415 COLL VENOUS BLD VENIPUNCTURE: CPT

## 2022-03-16 PROCEDURE — 82670 ASSAY OF TOTAL ESTRADIOL: CPT

## 2022-03-16 PROCEDURE — 84144 ASSAY OF PROGESTERONE: CPT

## 2022-03-16 PROCEDURE — 83002 ASSAY OF GONADOTROPIN (LH): CPT

## 2022-03-28 ENCOUNTER — APPOINTMENT (OUTPATIENT)
Dept: LAB | Facility: HOSPITAL | Age: 37
End: 2022-03-28
Payer: COMMERCIAL

## 2022-03-28 DIAGNOSIS — Z31.41 FERTILITY TESTING: ICD-10-CM

## 2022-03-28 LAB
ESTRADIOL SERPL-MCNC: 136 PG/ML
PROGEST SERPL-MCNC: 23.1 NG/ML

## 2022-03-28 PROCEDURE — 84144 ASSAY OF PROGESTERONE: CPT

## 2022-03-28 PROCEDURE — 36415 COLL VENOUS BLD VENIPUNCTURE: CPT

## 2022-03-28 PROCEDURE — 82670 ASSAY OF TOTAL ESTRADIOL: CPT

## 2022-04-04 ENCOUNTER — APPOINTMENT (OUTPATIENT)
Dept: LAB | Facility: HOSPITAL | Age: 37
End: 2022-04-04
Payer: COMMERCIAL

## 2022-04-06 ENCOUNTER — APPOINTMENT (OUTPATIENT)
Dept: LAB | Facility: HOSPITAL | Age: 37
End: 2022-04-06
Payer: COMMERCIAL

## 2022-04-06 DIAGNOSIS — Z31.41 FERTILITY TESTING: ICD-10-CM

## 2022-04-06 DIAGNOSIS — Z32.01 POSITIVE PREGNANCY TEST: ICD-10-CM

## 2022-04-06 DIAGNOSIS — Z32.00 POSSIBLE PREGNANCY, NOT CONFIRMED: ICD-10-CM

## 2022-04-06 LAB
B-HCG SERPL-ACNC: 355 MIU/ML
ESTRADIOL SERPL-MCNC: 228 PG/ML
PROGEST SERPL-MCNC: 19.9 NG/ML
TSH SERPL DL<=0.05 MIU/L-ACNC: 2.18 UIU/ML (ref 0.45–4.5)

## 2022-04-06 PROCEDURE — 84144 ASSAY OF PROGESTERONE: CPT

## 2022-04-06 PROCEDURE — 36415 COLL VENOUS BLD VENIPUNCTURE: CPT

## 2022-04-06 PROCEDURE — 84443 ASSAY THYROID STIM HORMONE: CPT

## 2022-04-06 PROCEDURE — 82670 ASSAY OF TOTAL ESTRADIOL: CPT

## 2022-04-06 PROCEDURE — 84702 CHORIONIC GONADOTROPIN TEST: CPT

## 2022-04-08 ENCOUNTER — APPOINTMENT (OUTPATIENT)
Dept: LAB | Facility: HOSPITAL | Age: 37
End: 2022-04-08
Payer: COMMERCIAL

## 2022-04-08 DIAGNOSIS — O02.81 CHEMICAL PREGNANCY: ICD-10-CM

## 2022-04-08 LAB
B-HCG SERPL-ACNC: 659 MIU/ML
ESTRADIOL SERPL-MCNC: 780 PG/ML
PROGEST SERPL-MCNC: 22.8 NG/ML

## 2022-04-08 PROCEDURE — 36415 COLL VENOUS BLD VENIPUNCTURE: CPT

## 2022-04-08 PROCEDURE — 84702 CHORIONIC GONADOTROPIN TEST: CPT

## 2022-04-08 PROCEDURE — 82670 ASSAY OF TOTAL ESTRADIOL: CPT

## 2022-04-08 PROCEDURE — 84144 ASSAY OF PROGESTERONE: CPT

## 2022-04-14 ENCOUNTER — APPOINTMENT (OUTPATIENT)
Dept: LAB | Facility: HOSPITAL | Age: 37
End: 2022-04-14
Payer: COMMERCIAL

## 2022-04-14 DIAGNOSIS — Z32.01 PREGNANCY EXAMINATION OR TEST, POSITIVE RESULT: ICD-10-CM

## 2022-04-14 LAB
B-HCG SERPL-ACNC: 2785 MIU/ML
ESTRADIOL SERPL-MCNC: 1102 PG/ML
PROGEST SERPL-MCNC: 23 NG/ML

## 2022-04-14 PROCEDURE — 36415 COLL VENOUS BLD VENIPUNCTURE: CPT

## 2022-04-14 PROCEDURE — 84702 CHORIONIC GONADOTROPIN TEST: CPT

## 2022-04-14 PROCEDURE — 84144 ASSAY OF PROGESTERONE: CPT

## 2022-04-14 PROCEDURE — 82670 ASSAY OF TOTAL ESTRADIOL: CPT

## 2022-04-21 ENCOUNTER — APPOINTMENT (OUTPATIENT)
Dept: LAB | Facility: HOSPITAL | Age: 37
End: 2022-04-21
Payer: COMMERCIAL

## 2022-04-21 DIAGNOSIS — O09.00 PREGNANCY WITH HISTORY OF INFERTILITY, ANTEPARTUM: ICD-10-CM

## 2022-04-21 LAB
B-HCG SERPL-ACNC: 7103 MIU/ML
ESTRADIOL SERPL-MCNC: 318 PG/ML
PROGEST SERPL-MCNC: 17.2 NG/ML

## 2022-04-21 PROCEDURE — 84144 ASSAY OF PROGESTERONE: CPT

## 2022-04-21 PROCEDURE — 36415 COLL VENOUS BLD VENIPUNCTURE: CPT

## 2022-04-21 PROCEDURE — 82670 ASSAY OF TOTAL ESTRADIOL: CPT

## 2022-04-21 PROCEDURE — 84702 CHORIONIC GONADOTROPIN TEST: CPT

## 2022-04-22 ENCOUNTER — TELEPHONE (OUTPATIENT)
Dept: OBGYN CLINIC | Facility: CLINIC | Age: 37
End: 2022-04-22

## 2022-04-22 NOTE — TELEPHONE ENCOUNTER
Pt wanted to know if she could do her early scans here vs going to infertility  Aware she will continue with them until released  Will keep apt here as scheduled  Was just trying to see if she could come here instead so that she wouldn't have to drive so far

## 2022-04-28 ENCOUNTER — APPOINTMENT (OUTPATIENT)
Dept: LAB | Facility: HOSPITAL | Age: 37
End: 2022-04-28
Payer: COMMERCIAL

## 2022-04-28 DIAGNOSIS — Z32.01 PREGNANCY EXAMINATION OR TEST, POSITIVE RESULT: ICD-10-CM

## 2022-04-28 DIAGNOSIS — O09.00 PREGNANCY WITH HISTORY OF INFERTILITY, ANTEPARTUM: ICD-10-CM

## 2022-04-28 LAB
B-HCG SERPL-ACNC: 7200 MIU/ML
ESTRADIOL SERPL-MCNC: 309 PG/ML
PROGEST SERPL-MCNC: 19.1 NG/ML

## 2022-04-28 PROCEDURE — 84702 CHORIONIC GONADOTROPIN TEST: CPT

## 2022-04-28 PROCEDURE — 36415 COLL VENOUS BLD VENIPUNCTURE: CPT

## 2022-04-28 PROCEDURE — 84144 ASSAY OF PROGESTERONE: CPT

## 2022-04-28 PROCEDURE — 82670 ASSAY OF TOTAL ESTRADIOL: CPT

## 2022-05-04 ENCOUNTER — APPOINTMENT (OUTPATIENT)
Dept: LAB | Facility: HOSPITAL | Age: 37
End: 2022-05-04
Payer: COMMERCIAL

## 2022-05-04 DIAGNOSIS — Z32.01 PREGNANCY EXAMINATION OR TEST, POSITIVE RESULT: ICD-10-CM

## 2022-05-04 LAB
B-HCG SERPL-ACNC: 3520 MIU/ML
ESTRADIOL SERPL-MCNC: 984 PG/ML
PROGEST SERPL-MCNC: 32.8 NG/ML

## 2022-05-04 PROCEDURE — 82670 ASSAY OF TOTAL ESTRADIOL: CPT

## 2022-05-04 PROCEDURE — 84144 ASSAY OF PROGESTERONE: CPT

## 2022-05-04 PROCEDURE — 84702 CHORIONIC GONADOTROPIN TEST: CPT

## 2022-05-04 PROCEDURE — 36415 COLL VENOUS BLD VENIPUNCTURE: CPT

## 2022-05-09 ENCOUNTER — OFFICE VISIT (OUTPATIENT)
Dept: OBGYN CLINIC | Facility: CLINIC | Age: 37
End: 2022-05-09
Payer: COMMERCIAL

## 2022-05-09 VITALS
DIASTOLIC BLOOD PRESSURE: 86 MMHG | HEIGHT: 60 IN | SYSTOLIC BLOOD PRESSURE: 124 MMHG | BODY MASS INDEX: 36.67 KG/M2 | WEIGHT: 186.8 LBS

## 2022-05-09 DIAGNOSIS — O03.9 SAB (SPONTANEOUS ABORTION): Primary | ICD-10-CM

## 2022-05-09 PROCEDURE — 99202 OFFICE O/P NEW SF 15 MIN: CPT | Performed by: PHYSICIAN ASSISTANT

## 2022-05-09 RX ORDER — LEVOTHYROXINE SODIUM 0.1 MG/1
100 TABLET ORAL DAILY
COMMUNITY

## 2022-05-09 RX ORDER — KETOCONAZOLE 20 MG/ML
SHAMPOO TOPICAL
COMMUNITY
Start: 2022-02-22 | End: 2022-05-09 | Stop reason: ALTCHOICE

## 2022-05-09 RX ORDER — PREDNISONE 1 MG/1
5 TABLET ORAL DAILY
COMMUNITY
Start: 2022-04-28

## 2022-05-09 NOTE — ASSESSMENT & PLAN NOTE
Reviewed incomplete SAB; plans to follow quant hcg back to normal through infertility  Reviewed what to expect and warnings signs to prompt ER evaluation  Likely plans another transfer with next cycle  Continue PNV + DHA daily   RTO for annual/update pap smear when she is ready but due at this time

## 2022-05-09 NOTE — PROGRESS NOTES
Assessment/Plan   Diagnoses and all orders for this visit:    SAB (spontaneous )    Discussion  Reviewed the findings of patient's history, bloodwork, and prior ultrasounds consistent with an incomplete SAB at this time  Most miscarriages will occur spontaneously and completely without intervention  Can consider cytotec or dilation and evacuation as well  Reviewed risks/benefits/side effects of all options  Pt to call office or report to the ER with fever/chills, severe cramping, abdominal or pelvic pain, vaginal bleeding heavier than the heaviest day of her period for more than 3 hours  Pt can take extra strength tylenol for cramping/pain and call if feels needs a stronger prescription  Patient aware to avoid ibuprofen  Pt aware nothing in the vagina, including tampon use, intercourse or taking baths  Will plan to follow quantitative hcg about every 2 weeks until reaches a non-pregnant value - reports infertility office will continue sending her for blood work  Patient can expect bleeding to be heavier and more crampy than her normal period and may last slightly longer than her normal period  Patient to call or return to office with any persistent bleeding, cramping or pain  Patient can expect a menstrual period in about 4-5 weeks  Patient to continue prenatal vitamin + DHA daily  If desires to change fertility care to a more local location can reach out for suggestions  Reviewed last pap and importance of a follow-up pap smear at this time  Encourage patient to schedule a routine gynecological annual examination to up date her pap smear screening once her miscarriage process is over and prior to or during her next pregnancy  All questions answered at this time  Patient to call with any questions/concerns  RTO for APE or sooner if needed    Subjective     HPI   Pippa Mckeon is a 29 y o  female who presents as an old/NP    B/L salpingectomy after 4 babies with now ex-; Since has remarried and desires pregnancy - working with [facility] in [place] - both patient and  genetic testing all negative; Day 3 embryos not genetically tested; Has gone through 2 rounds of IVF - first two transfers did not take; This was her third transfer and resulted in an SAB - has one day 3 frozen embryo left  Following labs - seemed to be rising appropriately until end of April where quant hcg stopped rising  Patient states ultrasound showed IUP with heart rate - initial was around 80s bpm and second ultrasound dropped to 60s bpm  Final ultrasound last Wednesday - couldn't even see a fetal pole or gestational sac any longer  Follow-up quant hcg on 5/4 did drop to 3520 (7200 on 4/28)  Reports started bleeding yesterday with a significant amount of bleeding, cramping, and clots (but not as significant as her prior miscarriages many years ago)  Today, bleeding and cramping is significantly less  States she will continue following quant hcg down with fertility and hoping to do the second embryo transfer with the next cycle  Believes O positive blood type but definitely RH positive - declines receiving rhogam with any of her prior miscarriages or pregnancies  She is a Christian  - will not accept blood products  Last Pap - 4/13/15 - WNL (+) C/(-) G  History of abnormal Pap smear:     Review of Systems   Constitutional: Negative for activity change, fatigue, fever and unexpected weight change  HENT: Negative for congestion, dental problem, sinus pressure and sinus pain  Eyes: Negative for visual disturbance  Respiratory: Negative for cough, shortness of breath and wheezing  Cardiovascular: Negative for chest pain and leg swelling  Gastrointestinal: Negative for abdominal distention, abdominal pain, blood in stool, constipation, diarrhea, nausea and vomiting  Endocrine: Negative for polydipsia  Genitourinary: Positive for vaginal bleeding (as noted in HPI)   Negative for difficulty urinating, dyspareunia, dysuria, frequency, hematuria, menstrual problem, pelvic pain, urgency, vaginal discharge and vaginal pain  Musculoskeletal: Negative for arthralgias and back pain  Allergic/Immunologic: Negative for environmental allergies  Neurological: Negative for dizziness, seizures and headaches  Psychiatric/Behavioral: Negative for dysphoric mood and sleep disturbance  The patient is not nervous/anxious  The following portions of the patient's history were reviewed and updated as appropriate: allergies, current medications, past family history, past medical history, past social history, past surgical history and problem list          Past Medical History:   Diagnosis Date    Anxiety     ASCUS with positive high risk HPV cervical 03/2008    Chicken pox     Depression     Disease of thyroid gland     hypothyroid    Genital herpes     Habitual aborter not currently pregnant     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       Past Surgical History:   Procedure Laterality Date    COLPOSCOPY  01/2013    DILATION AND CURETTAGE OF UTERUS  2006    SAB    LASIK      Last Assessed - 09 May 2016    SALPINGECTOMY Bilateral 05/2015    Scope bs    TOOTH EXTRACTION         Family History   Problem Relation Age of Onset    Anxiety disorder Mother     Dementia Maternal Grandmother         Alzheimer's    Colon cancer Maternal Grandmother     Hypertension Maternal Grandmother     Hypertension Paternal Grandmother     Diabetes Paternal Grandfather        Social History     Socioeconomic History    Marital status:      Spouse name: Not on file    Number of children: Not on file    Years of education: Not on file    Highest education level: Not on file   Occupational History    Occupation:  production     Tobacco Use    Smoking status: Never Smoker    Smokeless tobacco: Never Used   Vaping Use    Vaping Use: Never used   Substance and Sexual Activity    Alcohol use: Not Currently     Alcohol/week: 3 0 standard drinks     Types: 3 Shots of liquor per week     Comment: social    Drug use: No    Sexual activity: Yes     Partners: Male     Birth control/protection: Female Sterilization     Comment: B/L salpingectomy 5/29/2015   Other Topics Concern    Not on file   Social History Narrative    Per [employer] - exercises 5x a week, refusal of blood transfusion (Sikh)        Lives with significant other and children  Social Determinants of Health     Financial Resource Strain: Not on file   Food Insecurity: Not on file   Transportation Needs: Not on file   Physical Activity: Not on file   Stress: Not on file   Social Connections: Not on file   Intimate Partner Violence: Not on file   Housing Stability: Not on file         Current Outpatient Medications:     Cholecalciferol 25 MCG (1000 UT) capsule, Take 1,000 Units by mouth daily, Disp: , Rfl:     levothyroxine 100 mcg tablet, Take 100 mcg by mouth daily, Disp: , Rfl:     predniSONE 5 mg tablet, Take 5 mg by mouth daily  , Disp: , Rfl:     Prenatal Vit-Fe Fumarate-FA (PRENATAL 1+1 PO), Take by mouth, Disp: , Rfl:     No Known Allergies    Objective   Vitals:    05/09/22 1154   BP: 124/86   Weight: 84 7 kg (186 lb 12 8 oz)   Height: 5' (1 524 m)     Physical Exam  Vitals reviewed  Constitutional:       General: She is not in acute distress  Appearance: Normal appearance  She is not ill-appearing, toxic-appearing or diaphoretic  HENT:      Head: Normocephalic and atraumatic  Eyes:      Conjunctiva/sclera: Conjunctivae normal    Genitourinary:     Comments: Pelvic exam deferred due to currently miscarrying and no significant pain/discomfort or excessive bleeding/clotting  Neurological:      Mental Status: She is alert and oriented to person, place, and time  Psychiatric:         Mood and Affect: Mood normal          Behavior: Behavior normal          Thought Content:  Thought content normal          Judgment: Judgment normal          Patient Instructions   Miscarriage   AMBULATORY CARE:   A miscarriage  is the loss of a fetus within the first 20 weeks of pregnancy  A miscarriage may also be called a spontaneous  or an early pregnancy loss  Signs and symptoms of a miscarriage: You may not have symptoms of a miscarriage, or you may have any of the following:  · Vaginal spotting or heavy bleeding    · Pain or cramping in your abdomen or lower back    · Discharge of bloody fluid, tissue, or blood clots from your vagina    · Nausea or vomiting    · Dizziness    Seek care immediately if:   · You have foul-smelling drainage or pus coming from your vagina  · You have heavy vaginal bleeding and soak 1 pad or more in an hour  · You have severe abdominal pain  · You feel like your heart is beating faster than normal     · You feel extremely weak or dizzy  Contact your healthcare provider if:   · You have a fever greater than 100 4°F or chills  · You have extreme sadness, grief, or feel unable to cope with what has happened  · You have questions or concerns about your condition or care  Treatment  for a miscarriage may include medicine or surgery to help pass tissue from your uterus, control bleeding, or prevent infection  Self-care:   · Do not put anything in your vagina for 2 weeks or as directed  Do not use tampons, douche, or have sex  These actions can cause infection and pain  · Use sanitary pads as needed  You may have light bleeding or spotting for 2 weeks  · Do not take a bath or go swimming for 2 weeks or as directed  These actions may increase your risk for an infection  Take showers only  · Rest as needed  Slowly start to do more each day  Return to your daily activities as directed  · Talk to your healthcare provider about birth control    If you would like to prevent another pregnancy, ask your healthcare provider which type of birth control is best for you  · Join a support group or therapy to help you cope  A miscarriage may be very difficult for you, your partner, and other members of your family  There is no right way to feel after a miscarriage  You may feel overwhelming grief or other emotions  It may be helpful to talk to a friend, family member, or counselor about your feelings  You may worry that you could have another miscarriage  Talk to your healthcare provider about your concerns  He or she may be able to help you reduce the risk for another miscarriage  He or she may also help you find ways to cope with grief  For more information:   · [employer] [facility]   [address] [place] [address]  Phone: [phone]  Phone: [phone]  Web Address: http://[employer]/  org    · [facility]  [address] [facility] , [address] [place] [address]  Web Address: [employer]    Follow up with your healthcare provider as directed: You may need to see your healthcare provider for blood tests or an ultrasound  Write down your questions so you remember to ask them during your visits  © Copyright [employer] 2022 Information is for End User's use only and may not be sold, redistributed or otherwise used for commercial purposes  All illustrations and images included in CareNotes® are the copyrighted property of A D A M , Inc  or Kendra Iniguez  The above information is an  only  It is not intended as medical advice for individual conditions or treatments  Talk to your doctor, nurse or pharmacist before following any medical regimen to see if it is safe and effective for you

## 2022-05-09 NOTE — PATIENT INSTRUCTIONS
Miscarriage   AMBULATORY CARE:   A miscarriage  is the loss of a fetus within the first 20 weeks of pregnancy  A miscarriage may also be called a spontaneous  or an early pregnancy loss  Signs and symptoms of a miscarriage: You may not have symptoms of a miscarriage, or you may have any of the following:  · Vaginal spotting or heavy bleeding    · Pain or cramping in your abdomen or lower back    · Discharge of bloody fluid, tissue, or blood clots from your vagina    · Nausea or vomiting    · Dizziness    Seek care immediately if:   · You have foul-smelling drainage or pus coming from your vagina  · You have heavy vaginal bleeding and soak 1 pad or more in an hour  · You have severe abdominal pain  · You feel like your heart is beating faster than normal     · You feel extremely weak or dizzy  Contact your healthcare provider if:   · You have a fever greater than 100 4°F or chills  · You have extreme sadness, grief, or feel unable to cope with what has happened  · You have questions or concerns about your condition or care  Treatment  for a miscarriage may include medicine or surgery to help pass tissue from your uterus, control bleeding, or prevent infection  Self-care:   · Do not put anything in your vagina for 2 weeks or as directed  Do not use tampons, douche, or have sex  These actions can cause infection and pain  · Use sanitary pads as needed  You may have light bleeding or spotting for 2 weeks  · Do not take a bath or go swimming for 2 weeks or as directed  These actions may increase your risk for an infection  Take showers only  · Rest as needed  Slowly start to do more each day  Return to your daily activities as directed  · Talk to your healthcare provider about birth control  If you would like to prevent another pregnancy, ask your healthcare provider which type of birth control is best for you  · Join a support group or therapy to help you cope    A miscarriage may be very difficult for you, your partner, and other members of your family  There is no right way to feel after a miscarriage  You may feel overwhelming grief or other emotions  It may be helpful to talk to a friend, family member, or counselor about your feelings  You may worry that you could have another miscarriage  Talk to your healthcare provider about your concerns  He or she may be able to help you reduce the risk for another miscarriage  He or she may also help you find ways to cope with grief  For more information:   · [employer] [facility] [employer]   [address] [place] [address]  Phone: [phone]  Phone: [phone]  Web Address: http://[employer]/  org    · [facility]  [address] [facility] , [address] [place] [address]  Web Address: [employer]    Follow up with your healthcare provider as directed: You may need to see your healthcare provider for blood tests or an ultrasound  Write down your questions so you remember to ask them during your visits  © Copyright [employer] 2022 Information is for End User's use only and may not be sold, redistributed or otherwise used for commercial purposes  All illustrations and images included in CareNotes® are the copyrighted property of A [employer]  or [facility] Sonal Gandara   The above information is an  only  It is not intended as medical advice for individual conditions or treatments  Talk to your doctor, nurse or pharmacist before following any medical regimen to see if it is safe and effective for you

## 2022-05-12 ENCOUNTER — APPOINTMENT (OUTPATIENT)
Dept: LAB | Facility: HOSPITAL | Age: 37
End: 2022-05-12
Payer: COMMERCIAL

## 2022-05-12 ENCOUNTER — TRANSCRIBE ORDERS (OUTPATIENT)
Dept: LAB | Facility: HOSPITAL | Age: 37
End: 2022-05-12

## 2022-05-12 DIAGNOSIS — O02.1 MISSED ABORTION: ICD-10-CM

## 2022-05-12 DIAGNOSIS — O02.1 MISSED ABORTION: Primary | ICD-10-CM

## 2022-05-12 LAB — B-HCG SERPL-ACNC: 439 MIU/ML

## 2022-05-12 PROCEDURE — 84702 CHORIONIC GONADOTROPIN TEST: CPT

## 2022-05-12 PROCEDURE — 36415 COLL VENOUS BLD VENIPUNCTURE: CPT

## 2022-05-20 ENCOUNTER — APPOINTMENT (OUTPATIENT)
Dept: LAB | Facility: HOSPITAL | Age: 37
End: 2022-05-20
Payer: COMMERCIAL

## 2022-05-20 DIAGNOSIS — R76.8 FALSE POSITIVE SEROLOGICAL TEST FOR SYPHILIS: ICD-10-CM

## 2022-05-20 DIAGNOSIS — L65.9 BALDNESS: ICD-10-CM

## 2022-05-20 DIAGNOSIS — R53.83 FATIGUE, UNSPECIFIED TYPE: ICD-10-CM

## 2022-05-20 DIAGNOSIS — O02.1 MISSED ABORTION: ICD-10-CM

## 2022-05-20 LAB
B-HCG SERPL-ACNC: 204 MIU/ML
CRP SERPL QL: 28.7 MG/L
RHEUMATOID FACT SER QL LA: NEGATIVE
T3FREE SERPL-MCNC: 2.85 PG/ML (ref 2.3–4.2)
T4 FREE SERPL-MCNC: 1.23 NG/DL (ref 0.76–1.46)
TSH SERPL DL<=0.05 MIU/L-ACNC: 1.73 UIU/ML (ref 0.45–4.5)

## 2022-05-20 PROCEDURE — 84481 FREE ASSAY (FT-3): CPT

## 2022-05-20 PROCEDURE — 84443 ASSAY THYROID STIM HORMONE: CPT

## 2022-05-20 PROCEDURE — 84432 ASSAY OF THYROGLOBULIN: CPT

## 2022-05-20 PROCEDURE — 86235 NUCLEAR ANTIGEN ANTIBODY: CPT

## 2022-05-20 PROCEDURE — 86800 THYROGLOBULIN ANTIBODY: CPT

## 2022-05-20 PROCEDURE — 86200 CCP ANTIBODY: CPT

## 2022-05-20 PROCEDURE — 86376 MICROSOMAL ANTIBODY EACH: CPT

## 2022-05-20 PROCEDURE — 84439 ASSAY OF FREE THYROXINE: CPT

## 2022-05-20 PROCEDURE — 86430 RHEUMATOID FACTOR TEST QUAL: CPT

## 2022-05-20 PROCEDURE — 86038 ANTINUCLEAR ANTIBODIES: CPT

## 2022-05-20 PROCEDURE — 86140 C-REACTIVE PROTEIN: CPT

## 2022-05-20 PROCEDURE — 84702 CHORIONIC GONADOTROPIN TEST: CPT

## 2022-05-20 PROCEDURE — 84445 ASSAY OF TSI GLOBULIN: CPT

## 2022-05-21 LAB
ENA SS-A AB SER-ACNC: <0.2 AI (ref 0–0.9)
ENA SS-B AB SER-ACNC: <0.2 AI (ref 0–0.9)
THYROPEROXIDASE AB SERPL-ACNC: 21 IU/ML (ref 0–34)

## 2022-05-24 LAB — ANA TITR SER IF: NEGATIVE {TITER}

## 2022-05-25 LAB
CCP AB SER IA-ACNC: 1.4
TSI SER-ACNC: <0.1 IU/L (ref 0–0.55)

## 2022-05-27 ENCOUNTER — APPOINTMENT (OUTPATIENT)
Dept: LAB | Facility: HOSPITAL | Age: 37
End: 2022-05-27
Payer: COMMERCIAL

## 2022-05-27 DIAGNOSIS — O02.1 MISSED ABORTION: ICD-10-CM

## 2022-05-27 LAB — B-HCG SERPL-ACNC: 53 MIU/ML

## 2022-05-27 PROCEDURE — 84702 CHORIONIC GONADOTROPIN TEST: CPT

## 2022-05-27 PROCEDURE — 36415 COLL VENOUS BLD VENIPUNCTURE: CPT

## 2022-05-28 LAB
THYROGLOB AB SERPL-ACNC: 28.6 IU/ML (ref 0–0.9)
THYROGLOB SERPL-MCNC: 4 NG/ML

## 2022-06-01 ENCOUNTER — TELEPHONE (OUTPATIENT)
Dept: OBGYN CLINIC | Facility: CLINIC | Age: 37
End: 2022-06-01

## 2022-06-01 DIAGNOSIS — R10.2 PELVIC CRAMPING: Primary | ICD-10-CM

## 2022-06-01 DIAGNOSIS — N93.9 VAGINAL SPOTTING: ICD-10-CM

## 2022-06-01 NOTE — TELEPHONE ENCOUNTER
Pt reports intermittent cramping and spotting, denies heavy bleeding states barely enough to have to change a pad but does think she passed tissue yesterday  States has been going on since 5/8  US ordered and pt provided with central scheduling number to schedule and aware will review when results completed  Pt aware to call if any change in symptoms or increase in bleeding or with any questions or concerns

## 2022-06-01 NOTE — TELEPHONE ENCOUNTER
Pt lmom - at last apt mentioned to doctor she was having a miscarriage and still having on and off cramping and bleeding and her fertility doctor suggested to get an ultrasound and was wondering if could have that done through us

## 2022-06-02 ENCOUNTER — HOSPITAL ENCOUNTER (OUTPATIENT)
Dept: RADIOLOGY | Facility: HOSPITAL | Age: 37
Discharge: HOME | End: 2022-06-02
Payer: COMMERCIAL

## 2022-06-02 DIAGNOSIS — N93.9 VAGINAL SPOTTING: ICD-10-CM

## 2022-06-02 DIAGNOSIS — R10.2 PELVIC CRAMPING: ICD-10-CM

## 2022-06-02 PROCEDURE — 76830 TRANSVAGINAL US NON-OB: CPT

## 2022-06-02 PROCEDURE — 76856 US EXAM PELVIC COMPLETE: CPT

## 2022-06-03 ENCOUNTER — APPOINTMENT (OUTPATIENT)
Dept: LAB | Facility: HOSPITAL | Age: 37
End: 2022-06-03
Payer: COMMERCIAL

## 2022-06-03 DIAGNOSIS — Z31.83 ENCOUNTER FOR ASSISTED REPRODUCTIVE FERTILITY CYCLE: ICD-10-CM

## 2022-06-03 LAB
B-HCG SERPL-ACNC: <2 MIU/ML
ESTRADIOL SERPL-MCNC: 42 PG/ML
FSH SERPL-ACNC: 3.6 MIU/ML
LH SERPL-ACNC: 4.7 MIU/ML
PROGEST SERPL-MCNC: 0.4 NG/ML
TSH SERPL DL<=0.05 MIU/L-ACNC: 1.87 UIU/ML (ref 0.45–4.5)

## 2022-06-03 PROCEDURE — 84443 ASSAY THYROID STIM HORMONE: CPT

## 2022-06-03 PROCEDURE — 83002 ASSAY OF GONADOTROPIN (LH): CPT

## 2022-06-03 PROCEDURE — 84144 ASSAY OF PROGESTERONE: CPT

## 2022-06-03 PROCEDURE — 83001 ASSAY OF GONADOTROPIN (FSH): CPT

## 2022-06-03 PROCEDURE — 84702 CHORIONIC GONADOTROPIN TEST: CPT

## 2022-06-03 PROCEDURE — 36415 COLL VENOUS BLD VENIPUNCTURE: CPT

## 2022-06-03 PROCEDURE — 82670 ASSAY OF TOTAL ESTRADIOL: CPT

## 2022-06-07 ENCOUNTER — TRANSCRIBE ORDERS (OUTPATIENT)
Dept: ADMINISTRATIVE | Facility: HOSPITAL | Age: 37
End: 2022-06-07

## 2022-06-07 DIAGNOSIS — Z31.83 ENCOUNTER FOR ASSISTED REPRODUCTIVE FERTILITY PROCEDURE CYCLE: Primary | ICD-10-CM

## 2022-06-07 DIAGNOSIS — Z32.00 UNCONFIRMED PREGNANCY: ICD-10-CM

## 2022-06-09 ENCOUNTER — HOSPITAL ENCOUNTER (OUTPATIENT)
Dept: RADIOLOGY | Age: 37
Discharge: HOME | End: 2022-06-09
Payer: COMMERCIAL

## 2022-06-09 ENCOUNTER — APPOINTMENT (OUTPATIENT)
Dept: LAB | Facility: HOSPITAL | Age: 37
End: 2022-06-09
Payer: COMMERCIAL

## 2022-06-09 ENCOUNTER — TRANSCRIBE ORDERS (OUTPATIENT)
Dept: LAB | Facility: HOSPITAL | Age: 37
End: 2022-06-09

## 2022-06-09 DIAGNOSIS — Z31.83 ENCOUNTER FOR ASSISTED REPRODUCTIVE FERTILITY PROCEDURE CYCLE: ICD-10-CM

## 2022-06-09 DIAGNOSIS — Z31.41 FERTILITY TESTING: ICD-10-CM

## 2022-06-09 DIAGNOSIS — Z31.83 IN VITRO FERTILIZATION: ICD-10-CM

## 2022-06-09 DIAGNOSIS — Z31.83 IN VITRO FERTILIZATION: Primary | ICD-10-CM

## 2022-06-09 LAB
ESTRADIOL SERPL-MCNC: 367 PG/ML
LH SERPL-ACNC: 7.9 MIU/ML
PROGEST SERPL-MCNC: 0.3 NG/ML

## 2022-06-09 PROCEDURE — 82670 ASSAY OF TOTAL ESTRADIOL: CPT

## 2022-06-09 PROCEDURE — 84144 ASSAY OF PROGESTERONE: CPT

## 2022-06-09 PROCEDURE — 83002 ASSAY OF GONADOTROPIN (LH): CPT

## 2022-06-09 PROCEDURE — 76830 TRANSVAGINAL US NON-OB: CPT

## 2022-06-09 PROCEDURE — 36415 COLL VENOUS BLD VENIPUNCTURE: CPT

## 2022-06-21 ENCOUNTER — APPOINTMENT (OUTPATIENT)
Dept: LAB | Facility: HOSPITAL | Age: 37
End: 2022-06-21
Payer: COMMERCIAL

## 2022-06-21 DIAGNOSIS — Z31.41 FERTILITY TESTING: ICD-10-CM

## 2022-06-21 LAB
ESTRADIOL SERPL-MCNC: 311 PG/ML
PROGEST SERPL-MCNC: 26.5 NG/ML

## 2022-06-21 PROCEDURE — 84144 ASSAY OF PROGESTERONE: CPT

## 2022-06-21 PROCEDURE — 36415 COLL VENOUS BLD VENIPUNCTURE: CPT

## 2022-06-21 PROCEDURE — 82670 ASSAY OF TOTAL ESTRADIOL: CPT

## 2022-06-28 ENCOUNTER — APPOINTMENT (OUTPATIENT)
Dept: LAB | Facility: HOSPITAL | Age: 37
End: 2022-06-28
Payer: COMMERCIAL

## 2022-06-28 DIAGNOSIS — Z32.00 UNCONFIRMED PREGNANCY: ICD-10-CM

## 2022-06-28 DIAGNOSIS — Z31.83 ENCOUNTER FOR ASSISTED REPRODUCTIVE FERTILITY PROCEDURE CYCLE: ICD-10-CM

## 2022-06-28 LAB
B-HCG SERPL-ACNC: <2 MIU/ML
PROGEST SERPL-MCNC: 20.9 NG/ML

## 2022-06-28 PROCEDURE — 84144 ASSAY OF PROGESTERONE: CPT

## 2022-06-28 PROCEDURE — 84702 CHORIONIC GONADOTROPIN TEST: CPT

## 2022-06-28 PROCEDURE — 36415 COLL VENOUS BLD VENIPUNCTURE: CPT
